# Patient Record
Sex: FEMALE | Race: WHITE | NOT HISPANIC OR LATINO | Employment: OTHER | URBAN - METROPOLITAN AREA
[De-identification: names, ages, dates, MRNs, and addresses within clinical notes are randomized per-mention and may not be internally consistent; named-entity substitution may affect disease eponyms.]

---

## 2018-05-30 ENCOUNTER — HOSPITAL ENCOUNTER (OUTPATIENT)
Dept: RADIOLOGY | Facility: HOSPITAL | Age: 73
Discharge: HOME | End: 2018-05-30
Attending: OBSTETRICS & GYNECOLOGY
Payer: MEDICARE

## 2018-05-30 VITALS — BODY MASS INDEX: 22.43 KG/M2 | HEIGHT: 68 IN | WEIGHT: 148 LBS

## 2018-05-30 DIAGNOSIS — Z12.39 SCREENING BREAST EXAMINATION: ICD-10-CM

## 2018-05-30 PROCEDURE — 77067 SCR MAMMO BI INCL CAD: CPT | Mod: 26,,, | Performed by: RADIOLOGY

## 2018-05-30 PROCEDURE — 77063 BREAST TOMOSYNTHESIS BI: CPT | Mod: 26,,, | Performed by: RADIOLOGY

## 2018-05-30 PROCEDURE — 77067 SCR MAMMO BI INCL CAD: CPT | Mod: TC,PN

## 2019-02-28 ENCOUNTER — TELEPHONE (OUTPATIENT)
Dept: VASCULAR SURGERY | Facility: CLINIC | Age: 74
End: 2019-02-28

## 2019-02-28 ENCOUNTER — OFFICE VISIT (OUTPATIENT)
Dept: DERMATOLOGY | Facility: CLINIC | Age: 74
End: 2019-02-28
Payer: MEDICARE

## 2019-02-28 VITALS — BODY MASS INDEX: 23.04 KG/M2 | HEIGHT: 68 IN | WEIGHT: 152 LBS

## 2019-02-28 DIAGNOSIS — I87.8 VENOUS STASIS: ICD-10-CM

## 2019-02-28 DIAGNOSIS — I78.1 SPIDER VEINS: Primary | ICD-10-CM

## 2019-02-28 DIAGNOSIS — I87.309 STASIS EDEMA, UNSPECIFIED LATERALITY: ICD-10-CM

## 2019-02-28 DIAGNOSIS — I83.899 SYMPTOMATIC RETICULAR VEINS: ICD-10-CM

## 2019-02-28 PROCEDURE — 99203 OFFICE O/P NEW LOW 30 MIN: CPT | Mod: S$PBB,,, | Performed by: DERMATOLOGY

## 2019-02-28 PROCEDURE — 99203 PR OFFICE/OUTPT VISIT, NEW, LEVL III, 30-44 MIN: ICD-10-PCS | Mod: S$PBB,,, | Performed by: DERMATOLOGY

## 2019-02-28 PROCEDURE — 99999 PR PBB SHADOW E&M-EST. PATIENT-LVL III: CPT | Mod: PBBFAC,,, | Performed by: DERMATOLOGY

## 2019-02-28 PROCEDURE — 99999 PR PBB SHADOW E&M-EST. PATIENT-LVL III: ICD-10-PCS | Mod: PBBFAC,,, | Performed by: DERMATOLOGY

## 2019-02-28 PROCEDURE — 99213 OFFICE O/P EST LOW 20 MIN: CPT | Mod: PBBFAC,PO | Performed by: DERMATOLOGY

## 2019-02-28 NOTE — LETTER
February 28, 2019      Fauzia Michaud MD  [address] [facility] [address]  [facility] [address]           [address]  [facility] [address]  Phone: [phone]          Patient: Naila Huerta   MR Number: [id number]   YOB: 1945   Date of Visit: 2/28/2019       Dear Dr. Fauzia Michaud:    Thank you for referring Naila Huerta to me for evaluation. Attached you will find relevant portions of my assessment and plan of care.    If you have questions, please do not hesitate to call me. I look forward to following Naila Huerta along with you.    Sincerely,    Fidel Aguirre MD    Enclosure  CC:  No Recipients    If you would like to receive this communication electronically, please contact [email or web address] or [phone] to request more information on [employer] access.    For providers and/or their staff who would like to refer a patient to [facility], please contact us through our one-stop-shop provider referral line, [facility], at [phone].    If you feel you have received this communication in error or would no longer like to receive these types of communications, please e-mail [email or web address]

## 2019-02-28 NOTE — TELEPHONE ENCOUNTER
----- Message from Sowmya Johnson LPN sent at 2/28/2019 10:35 AM CST -----  Patient assessed for spider veins treatment.  Wishes to have vascular assessment prior to us treating cosmetic spider veins.  Please contact patient for appt.

## 2019-02-28 NOTE — PROGRESS NOTES
Subjective:       Patient ID:  Naila Huerta is a 73 y.o. female who presents for   Chief Complaint   Patient presents with    Spider Veins     balwinder lower legs     New patient here today for eval of spider veins to BLE.  Previously treated veins to left inner leg by Dr. Chin at [facility] doctor about 3 years ago.         Review of Systems   Constitutional: Negative for fever.   HENT: Negative for sore throat and mouth sores.    Eyes: Negative for itching and eye watering.   Respiratory: Negative for cough.    Cardiovascular: Positive for pedal edema.   Musculoskeletal: Negative for joint swelling and arthralgias.   Skin: Negative for rash, daily sunscreen use, activity-related sunscreen use and wears hat.        Objective:    Physical Exam   Constitutional: She appears well-developed and well-nourished.   Eyes: No conjunctival no injection.   Cardiovascular: There is dependent edema.  There is no local extremity swelling.     Neurological: She is alert and oriented to person, place, and time.   Psychiatric: She has a normal mood and affect.   Skin:   Areas Examined (abnormalities noted in diagram):   Head / Face Inspection Performed  Neck Inspection Performed  RUE Inspected  LUE Inspection Performed  RLE Inspected  LLE Inspection Performed              Diagram Legend     Erythematous scaling macule/papule c/w actinic keratosis       Vascular papule c/w angioma      Pigmented verrucoid papule/plaque c/w seborrheic keratosis      Yellow umbilicated papule c/w sebaceous hyperplasia      Irregularly shaped tan macule c/w lentigo     1-2 mm smooth white papules consistent with Milia      Movable subcutaneous cyst with punctum c/w epidermal inclusion cyst      Subcutaneous movable cyst c/w pilar cyst      Firm pink to brown papule c/w dermatofibroma      Pedunculated fleshy papule(s) c/w skin tag(s)      Evenly pigmented macule c/w junctional nevus     Mildly variegated pigmented, slightly  irregular-bordered macule c/w mildly atypical nevus      Flesh colored to evenly pigmented papule c/w intradermal nevus       Pink pearly papule/plaque c/w basal cell carcinoma      Erythematous hyperkeratotic cursted plaque c/w SCC      Surgical scar with no sign of skin cancer recurrence      Open and closed comedones      Inflammatory papules and pustules      Verrucoid papule consistent consistent with wart     Erythematous eczematous patches and plaques     Dystrophic onycholytic nail with subungual debris c/w onychomycosis     Umbilicated papule    Erythematous-base heme-crusted tan verrucoid plaque consistent with inflamed seborrheic keratosis     Erythematous Silvery Scaling Plaque c/w Psoriasis     See annotation      Assessment / Plan:        Spider veins  Discussed with patient the etiology and pathogenesis of the disease or skin lesion(s) and possible treatments and aggravators.    Reviewed with patient different treatment options and associated risks.  Had sclero 3 years ago with vascular at [facility].  Discussed with patient the risks of sclerotherapy including, but not limited, to cosmetic costs, no change, scarring, ulcers, color scars, worsening, recurrence, need for compression stockings, possible need for ultrasound study of deeper veins and arteries with vascular surgery, and slow resolution, if any, of veins over three months.    Symptomatic reticular veins  Refer to vascular for eval of deep vein system.  May need ultrasound study.    Venous stasis  Discussed with patient the etiology and pathogenesis of the disease or skin lesion(s) and possible treatments and aggravators.    Discussed with patient need to wear compression stockings and to elevate legs regularly, especially with sitting.  Patient to consider elevating legs during sleep if no pain in the toes or numbness.  Patient can consider using ace wrap in lieu of stockings but has to watch out for bluing of the toes or pain or  numbness in the toes.  Patient may need evaluation with their primary or cardiologist for leg swelling.    Stasis edema, unspecified laterality  Refer to vascular.  Use pt's inversion table daily.  Pt doesn't want to wear compression stockings.           Follow-up if symptoms worsen or fail to improve.

## 2019-06-04 ENCOUNTER — TELEPHONE (OUTPATIENT)
Dept: DERMATOLOGY | Facility: CLINIC | Age: 74
End: 2019-06-04

## 2019-06-04 NOTE — TELEPHONE ENCOUNTER
Patient notified that Dr. Luna is no longer taking new patients for veins.  Will have to seek cosmetic vein consult outside of [facility].

## 2019-06-04 NOTE — TELEPHONE ENCOUNTER
----- Message from Sydnee Valladares sent at 6/4/2019 12:17 PM CDT -----  Contact: Naila  Type: Needs Medical Advice    Who Called:  patient  Best Call Back Number: [phone]  Additional Information: requesting a call back regarding a CV surgeon that can see her for an evaluation before cosmetic treatment of Spider Veins--patient would like to have an US first prior to treatment as well to see what is causing the swelling in her ankles (L is worse)--states that she was told that someone would contact her regarding the referral but no referral is listed in Epic & no one has contacted her regarding the Vein Evaluation--please advise--thank you

## 2019-06-18 ENCOUNTER — TELEPHONE (OUTPATIENT)
Dept: DERMATOLOGY | Facility: CLINIC | Age: 74
End: 2019-06-18

## 2019-06-18 NOTE — TELEPHONE ENCOUNTER
----- Message from Jose A MCINTYRE Jorjeroque sent at 6/18/2019  2:34 PM CDT -----  Contact: same  Patient called in and stated she was supposed to get a referral to a vascular surgeon from office based on Dr. Aguirre's findings when she went for her appt in February 2019.  Patient stated she already spoke to Dr. Frank's office & he know longer does this & Dr. Flakito rahman is booked out too far?    Patient call back number is [phone]

## 2019-07-01 NOTE — TELEPHONE ENCOUNTER
Several messages left for patient to let her know the place of referal is no longer taking new patients.  Unable to give furtehr recs.

## 2019-08-27 ENCOUNTER — HOSPITAL ENCOUNTER (OUTPATIENT)
Dept: PREADMISSION TESTING | Facility: OTHER | Age: 74
Discharge: HOME | End: 2019-08-27
Attending: ORTHOPAEDIC SURGERY
Payer: MEDICARE

## 2019-08-27 ENCOUNTER — ANESTHESIA EVENT (OUTPATIENT)
Dept: SURGERY | Facility: OTHER | Age: 74
End: 2019-08-27
Payer: MEDICARE

## 2019-08-27 VITALS
WEIGHT: 145 LBS | BODY MASS INDEX: 22.76 KG/M2 | TEMPERATURE: 98 F | DIASTOLIC BLOOD PRESSURE: 69 MMHG | HEART RATE: 64 BPM | OXYGEN SATURATION: 99 % | HEIGHT: 67 IN | SYSTOLIC BLOOD PRESSURE: 131 MMHG

## 2019-08-27 RX ORDER — LIDOCAINE HYDROCHLORIDE 10 MG/ML
0.5 INJECTION, SOLUTION EPIDURAL; INFILTRATION; INTRACAUDAL; PERINEURAL ONCE
Status: CANCELLED | OUTPATIENT
Start: 2019-08-27 | End: 2019-08-27

## 2019-08-27 RX ORDER — CELECOXIB 200 MG/1
400 CAPSULE ORAL
Status: CANCELLED | OUTPATIENT
Start: 2019-08-27 | End: 2019-08-27

## 2019-08-27 RX ORDER — PREGABALIN 75 MG/1
75 CAPSULE ORAL
Status: CANCELLED | OUTPATIENT
Start: 2019-08-27 | End: 2019-08-27

## 2019-08-27 RX ORDER — OMEPRAZOLE 20 MG/1
20 CAPSULE, DELAYED RELEASE ORAL DAILY
COMMUNITY
End: 2022-07-14 | Stop reason: SDUPTHER

## 2019-08-27 RX ORDER — VITAMIN B COMPLEX
1 TABLET ORAL DAILY
COMMUNITY

## 2019-08-27 RX ORDER — ACETAMINOPHEN 500 MG
1000 TABLET ORAL
Status: CANCELLED | OUTPATIENT
Start: 2019-08-27 | End: 2019-08-27

## 2019-08-27 RX ORDER — SODIUM CHLORIDE, SODIUM LACTATE, POTASSIUM CHLORIDE, CALCIUM CHLORIDE 600; 310; 30; 20 MG/100ML; MG/100ML; MG/100ML; MG/100ML
INJECTION, SOLUTION INTRAVENOUS CONTINUOUS
Status: CANCELLED | OUTPATIENT
Start: 2019-08-27

## 2019-08-27 NOTE — DISCHARGE INSTRUCTIONS
PRE-ADMIT TESTING -  [phone]    [address]  [place] [facility]          Your surgery has been scheduled at [facility]. We are pleased to have the opportunity to serve you. For Further Information please call [phone].    On the day of surgery please report to the Information Desk on the 1st floor.    · CONTACT YOUR PHYSICIAN'S OFFICE THE DAY PRIOR TO YOUR SURGERY TO OBTAIN YOUR ARRIVAL TIME.     · The evening before surgery do not eat anything after 9 p.m. ( this includes hard candy, chewing gum and mints).  You may only have GATORADE, POWERADE AND WATER  from 9 p.m. until you leave your home.   DO NOT DRINK ANY LIQUIDS ON THE WAY TO THE HOSPITAL.      SPECIAL MEDICATION INSTRUCTIONS: TAKE medications checked off by the Anesthesiologist on your Medication List.    Angiogram Patients: Take medications as instructed by your physician, including aspirin.     Surgery Patients:    If you take ASPIRIN - Your PHYSICIAN/SURGEON will need to inform you IF/OR when you need to stop taking aspirin prior to your surgery.     Do Not take any medications containing IBUPROFEN.  Do Not Wear any make-up or dark nail polish   (especially eye make-up) to surgery. If you come to surgery with makeup on you will be required to remove the makeup or nail polish.    Do not shave your surgical area at least 5 days prior to your surgery. The surgical prep will be performed at the hospital according to Infection Control regulations.    Leave all valuables at home.   Do Not wear any jewelry or watches, including any metal in body piercings. Jewelry must be removed prior to coming to the hospital.  There is a possibility that rings that are unable to be removed may be cut off if they are on the surgical extremity.    Contact Lens must be removed before surgery. Either do not wear the contact lens or bring a case and solution for storage.  Please bring a container for eyeglasses or dentures as required.  Bring  any paperwork your physician has provided, such as consent forms,  history and physicals, doctor's orders, etc.   Bring comfortable clothes that are loose fitting to wear upon discharge. Take into consideration the type of surgery being performed.  Maintain your diet as advised per your physician the day prior to surgery.      Adequate rest the night before surgery is advised.   Park in the Parking lot behind the hospital or in the [facility] across the street from the parking lot. Parking is complimentary.  If you will be discharged the same day as your procedure, please arrange for a responsible adult to drive you home or to accompany you if traveling by taxi.   YOU WILL NOT BE PERMITTED TO DRIVE OR TO LEAVE THE HOSPITAL ALONE AFTER SURGERY.   It is strongly recommended that you arrange for someone to remain with you for the first 24 hrs following your surgery.    The Surgeon will speak to your family/visitor after your surgery regarding the outcome of your surgery and post op care.  The Surgeon may speak to you after your surgery, but there is a possibility you may not remember the details.  Please check with your family members regarding the conversation with the Surgeon.    We strongly recommend whoever is bringing you home be present for discharge instructions.  This will ensure a thorough understanding for your post op home care.      Thank you for your cooperation.  The Staff of [facility].                Bathing Instructions with Hibiclens     Shower the evening before and morning of your procedure with Hibiclens:   Wash your face with water and your regular face wash/soap   Apply Hibiclens directly on your skin or on a wet washcloth and wash gently. When showering: Move away from the shower stream when applying Hibiclens to avoid rinsing off too soon.   Rinse thoroughly with warm water   Do not dilute Hibiclens         Dry off as usual, do not use any deodorant,  powder, body lotions, perfume, after shave or cologne.

## 2019-08-27 NOTE — ANESTHESIA PREPROCEDURE EVALUATION
08/27/2019  Naila Huerta is a 74 y.o., female.    Anesthesia Evaluation    I have reviewed the Patient Summary Reports.    I have reviewed the Nursing Notes.   I have reviewed the Medications.     Review of Systems  Anesthesia Hx:  PONV History of prior surgery of interest to airway management or planning: Denies Family Hx of Anesthesia complications.   Denies Personal Hx of Anesthesia complications.   Social:  Non-Smoker    Hematology/Oncology:  Hematology Normal   Oncology Normal     EENT/Dental:EENT/Dental Normal   Cardiovascular:   Exercise tolerance: good Hypertension, well controlled    Pulmonary:  Pulmonary Normal    Renal/:  Renal/ Normal     Hepatic/GI:   GERD, well controlled EGD for eso dilitation   Musculoskeletal:  Musculoskeletal Normal    Neurological:  Neurology Normal    Endocrine:  Endocrine Normal    Dermatological:  Skin Normal    Psych:  Psychiatric Normal           Physical Exam  General:  Well nourished    Airway/Jaw/Neck:  Airway Findings: Mouth Opening: Normal Tongue: Normal  Mallampati: II      Dental:  Dental Findings: Molar caps        Mental Status:  Mental Status Findings:  Cooperative, Alert and Oriented         Anesthesia Plan  Type of Anesthesia, risks & benefits discussed:  Anesthesia Type:  general  Patient's Preference:   Intra-op Monitoring Plan: standard ASA monitors  Intra-op Monitoring Plan Comments:   Post Op Pain Control Plan: multimodal analgesia  Post Op Pain Control Plan Comments:   Induction:   IV  Beta Blocker:         Informed Consent: Patient understands risks and agrees with Anesthesia plan.  Questions answered. Anesthesia consent signed with patient.  ASA Score: 2     Day of Surgery Review of History & Physical:    H&P update referred to the surgeon.     Anesthesia Plan Notes: Lab ok in epic        Ready For Surgery From Anesthesia  Perspective.

## 2019-09-06 ENCOUNTER — ANESTHESIA (OUTPATIENT)
Dept: SURGERY | Facility: OTHER | Age: 74
End: 2019-09-06
Payer: MEDICARE

## 2019-09-06 ENCOUNTER — HOSPITAL ENCOUNTER (OUTPATIENT)
Facility: OTHER | Age: 74
Discharge: HOME | End: 2019-09-06
Attending: ORTHOPAEDIC SURGERY | Admitting: ORTHOPAEDIC SURGERY
Payer: MEDICARE

## 2019-09-06 VITALS
OXYGEN SATURATION: 99 % | DIASTOLIC BLOOD PRESSURE: 82 MMHG | RESPIRATION RATE: 16 BRPM | TEMPERATURE: 99 F | SYSTOLIC BLOOD PRESSURE: 133 MMHG | HEIGHT: 67 IN | BODY MASS INDEX: 22.76 KG/M2 | HEART RATE: 62 BPM | WEIGHT: 145 LBS

## 2019-09-06 DIAGNOSIS — S83.242A ACUTE MEDIAL MENISCUS TEAR OF LEFT KNEE, INITIAL ENCOUNTER: Primary | ICD-10-CM

## 2019-09-06 PROCEDURE — 25000003 PHARM REV CODE 250: Performed by: ANESTHESIOLOGY

## 2019-09-06 PROCEDURE — 71000033 HC RECOVERY, INTIAL HOUR: Performed by: ORTHOPAEDIC SURGERY

## 2019-09-06 PROCEDURE — 36000711: Performed by: ORTHOPAEDIC SURGERY

## 2019-09-06 PROCEDURE — 63600175 PHARM REV CODE 636 W HCPCS: Performed by: ANESTHESIOLOGY

## 2019-09-06 PROCEDURE — 37000008 HC ANESTHESIA 1ST 15 MINUTES: Performed by: ORTHOPAEDIC SURGERY

## 2019-09-06 PROCEDURE — 71000016 HC POSTOP RECOV ADDL HR: Performed by: ORTHOPAEDIC SURGERY

## 2019-09-06 PROCEDURE — 25000003 PHARM REV CODE 250: Performed by: NURSE ANESTHETIST, CERTIFIED REGISTERED

## 2019-09-06 PROCEDURE — 37000009 HC ANESTHESIA EA ADD 15 MINS: Performed by: ORTHOPAEDIC SURGERY

## 2019-09-06 PROCEDURE — 27201423 OPTIME MED/SURG SUP & DEVICES STERILE SUPPLY: Performed by: ORTHOPAEDIC SURGERY

## 2019-09-06 PROCEDURE — 63600175 PHARM REV CODE 636 W HCPCS: Performed by: ORTHOPAEDIC SURGERY

## 2019-09-06 PROCEDURE — 71000039 HC RECOVERY, EACH ADD'L HOUR: Performed by: ORTHOPAEDIC SURGERY

## 2019-09-06 PROCEDURE — 25000003 PHARM REV CODE 250: Performed by: ORTHOPAEDIC SURGERY

## 2019-09-06 PROCEDURE — 71000015 HC POSTOP RECOV 1ST HR: Performed by: ORTHOPAEDIC SURGERY

## 2019-09-06 PROCEDURE — 36000710: Performed by: ORTHOPAEDIC SURGERY

## 2019-09-06 PROCEDURE — 63600175 PHARM REV CODE 636 W HCPCS: Performed by: NURSE ANESTHETIST, CERTIFIED REGISTERED

## 2019-09-06 RX ORDER — LIDOCAINE HYDROCHLORIDE 10 MG/ML
0.5 INJECTION, SOLUTION EPIDURAL; INFILTRATION; INTRACAUDAL; PERINEURAL ONCE
Status: DISCONTINUED | OUTPATIENT
Start: 2019-09-06 | End: 2019-09-06 | Stop reason: HOSPADM

## 2019-09-06 RX ORDER — PREGABALIN 75 MG/1
75 CAPSULE ORAL
Status: DISCONTINUED | OUTPATIENT
Start: 2019-09-06 | End: 2019-09-06 | Stop reason: HOSPADM

## 2019-09-06 RX ORDER — MIDAZOLAM HYDROCHLORIDE 1 MG/ML
INJECTION INTRAMUSCULAR; INTRAVENOUS
Status: DISCONTINUED | OUTPATIENT
Start: 2019-09-06 | End: 2019-09-06

## 2019-09-06 RX ORDER — HYDROMORPHONE HYDROCHLORIDE 2 MG/ML
0.4 INJECTION, SOLUTION INTRAMUSCULAR; INTRAVENOUS; SUBCUTANEOUS EVERY 5 MIN PRN
Status: DISCONTINUED | OUTPATIENT
Start: 2019-09-06 | End: 2019-09-06 | Stop reason: HOSPADM

## 2019-09-06 RX ORDER — DEXTROSE MONOHYDRATE AND SODIUM CHLORIDE 5; .45 G/100ML; G/100ML
INJECTION, SOLUTION INTRAVENOUS CONTINUOUS
Status: DISCONTINUED | OUTPATIENT
Start: 2019-09-06 | End: 2019-09-06 | Stop reason: HOSPADM

## 2019-09-06 RX ORDER — MORPHINE SULFATE 10 MG/ML
INJECTION, SOLUTION INTRAMUSCULAR; INTRAVENOUS
Status: DISCONTINUED | OUTPATIENT
Start: 2019-09-06 | End: 2019-09-06 | Stop reason: HOSPADM

## 2019-09-06 RX ORDER — BUPIVACAINE HYDROCHLORIDE 2.5 MG/ML
INJECTION, SOLUTION EPIDURAL; INFILTRATION; INTRACAUDAL
Status: DISCONTINUED | OUTPATIENT
Start: 2019-09-06 | End: 2019-09-06 | Stop reason: HOSPADM

## 2019-09-06 RX ORDER — SODIUM CHLORIDE 0.9 % (FLUSH) 0.9 %
3 SYRINGE (ML) INJECTION
Status: DISCONTINUED | OUTPATIENT
Start: 2019-09-06 | End: 2019-09-06 | Stop reason: HOSPADM

## 2019-09-06 RX ORDER — ACETAMINOPHEN 500 MG
1000 TABLET ORAL
Status: COMPLETED | OUTPATIENT
Start: 2019-09-06 | End: 2019-09-06

## 2019-09-06 RX ORDER — TRAMADOL HYDROCHLORIDE 50 MG/1
TABLET ORAL
Qty: 40 TABLET | Refills: 0 | Status: SHIPPED | OUTPATIENT
Start: 2019-09-06 | End: 2019-10-09

## 2019-09-06 RX ORDER — FENTANYL CITRATE 50 UG/ML
INJECTION, SOLUTION INTRAMUSCULAR; INTRAVENOUS
Status: DISCONTINUED | OUTPATIENT
Start: 2019-09-06 | End: 2019-09-06

## 2019-09-06 RX ORDER — CELECOXIB 200 MG/1
400 CAPSULE ORAL
Status: COMPLETED | OUTPATIENT
Start: 2019-09-06 | End: 2019-09-06

## 2019-09-06 RX ORDER — OXYCODONE HYDROCHLORIDE 5 MG/1
5 TABLET ORAL
Status: DISCONTINUED | OUTPATIENT
Start: 2019-09-06 | End: 2019-09-06 | Stop reason: HOSPADM

## 2019-09-06 RX ORDER — ONDANSETRON 2 MG/ML
4 INJECTION INTRAMUSCULAR; INTRAVENOUS DAILY PRN
Status: DISCONTINUED | OUTPATIENT
Start: 2019-09-06 | End: 2019-09-06 | Stop reason: HOSPADM

## 2019-09-06 RX ORDER — ONDANSETRON 8 MG/1
8 TABLET, ORALLY DISINTEGRATING ORAL ONCE
Status: COMPLETED | OUTPATIENT
Start: 2019-09-06 | End: 2019-09-06

## 2019-09-06 RX ORDER — ONDANSETRON 4 MG/1
8 TABLET, ORALLY DISINTEGRATING ORAL EVERY 8 HOURS PRN
Qty: 20 TABLET | Refills: 0 | Status: SHIPPED | OUTPATIENT
Start: 2019-09-06 | End: 2019-10-09

## 2019-09-06 RX ORDER — ONDANSETRON 2 MG/ML
INJECTION INTRAMUSCULAR; INTRAVENOUS
Status: DISCONTINUED | OUTPATIENT
Start: 2019-09-06 | End: 2019-09-06

## 2019-09-06 RX ORDER — PROPOFOL 10 MG/ML
VIAL (ML) INTRAVENOUS
Status: DISCONTINUED | OUTPATIENT
Start: 2019-09-06 | End: 2019-09-06

## 2019-09-06 RX ORDER — LIDOCAINE HCL/PF 100 MG/5ML
SYRINGE (ML) INTRAVENOUS
Status: DISCONTINUED | OUTPATIENT
Start: 2019-09-06 | End: 2019-09-06

## 2019-09-06 RX ORDER — CEFAZOLIN SODIUM 1 G/3ML
2 INJECTION, POWDER, FOR SOLUTION INTRAMUSCULAR; INTRAVENOUS
Status: DISCONTINUED | OUTPATIENT
Start: 2019-09-06 | End: 2019-09-06 | Stop reason: HOSPADM

## 2019-09-06 RX ORDER — MEPERIDINE HYDROCHLORIDE 25 MG/ML
12.5 INJECTION INTRAMUSCULAR; INTRAVENOUS; SUBCUTANEOUS ONCE AS NEEDED
Status: DISCONTINUED | OUTPATIENT
Start: 2019-09-06 | End: 2019-09-06 | Stop reason: HOSPADM

## 2019-09-06 RX ORDER — SODIUM CHLORIDE, SODIUM LACTATE, POTASSIUM CHLORIDE, CALCIUM CHLORIDE 600; 310; 30; 20 MG/100ML; MG/100ML; MG/100ML; MG/100ML
INJECTION, SOLUTION INTRAVENOUS CONTINUOUS
Status: DISCONTINUED | OUTPATIENT
Start: 2019-09-06 | End: 2019-09-06 | Stop reason: HOSPADM

## 2019-09-06 RX ORDER — KETOROLAC TROMETHAMINE 30 MG/ML
15 INJECTION, SOLUTION INTRAMUSCULAR; INTRAVENOUS EVERY 6 HOURS PRN
Status: DISCONTINUED | OUTPATIENT
Start: 2019-09-06 | End: 2019-09-06 | Stop reason: HOSPADM

## 2019-09-06 RX ORDER — METHYLPREDNISOLONE ACETATE 40 MG/ML
INJECTION, SUSPENSION INTRA-ARTICULAR; INTRALESIONAL; INTRAMUSCULAR; SOFT TISSUE
Status: DISCONTINUED | OUTPATIENT
Start: 2019-09-06 | End: 2019-09-06 | Stop reason: HOSPADM

## 2019-09-06 RX ADMIN — PROMETHAZINE HYDROCHLORIDE 6.25 MG: 25 INJECTION INTRAMUSCULAR; INTRAVENOUS at 10:09

## 2019-09-06 RX ADMIN — LIDOCAINE HYDROCHLORIDE 50 MG: 20 INJECTION, SOLUTION INTRAVENOUS at 07:09

## 2019-09-06 RX ADMIN — OXYCODONE HYDROCHLORIDE 5 MG: 5 TABLET ORAL at 08:09

## 2019-09-06 RX ADMIN — CEFAZOLIN 2 G: 330 INJECTION, POWDER, FOR SOLUTION INTRAMUSCULAR; INTRAVENOUS at 07:09

## 2019-09-06 RX ADMIN — HYDROMORPHONE HYDROCHLORIDE 0.4 MG: 2 INJECTION, SOLUTION INTRAMUSCULAR; INTRAVENOUS; SUBCUTANEOUS at 08:09

## 2019-09-06 RX ADMIN — FENTANYL CITRATE 100 MCG: 50 INJECTION, SOLUTION INTRAMUSCULAR; INTRAVENOUS at 07:09

## 2019-09-06 RX ADMIN — SODIUM CHLORIDE, SODIUM LACTATE, POTASSIUM CHLORIDE, AND CALCIUM CHLORIDE: 600; 310; 30; 20 INJECTION, SOLUTION INTRAVENOUS at 06:09

## 2019-09-06 RX ADMIN — ONDANSETRON 8 MG: 8 TABLET, ORALLY DISINTEGRATING ORAL at 10:09

## 2019-09-06 RX ADMIN — ACETAMINOPHEN 1000 MG: 500 TABLET, FILM COATED ORAL at 05:09

## 2019-09-06 RX ADMIN — ONDANSETRON 4 MG: 2 INJECTION INTRAMUSCULAR; INTRAVENOUS at 07:09

## 2019-09-06 RX ADMIN — CARBOXYMETHYLCELLULOSE SODIUM 2 DROP: 2.5 SOLUTION/ DROPS OPHTHALMIC at 07:09

## 2019-09-06 RX ADMIN — MIDAZOLAM HYDROCHLORIDE 2 MG: 1 INJECTION, SOLUTION INTRAMUSCULAR; INTRAVENOUS at 07:09

## 2019-09-06 RX ADMIN — PROPOFOL 150 MG: 10 INJECTION, EMULSION INTRAVENOUS at 07:09

## 2019-09-06 RX ADMIN — CELECOXIB 400 MG: 200 CAPSULE ORAL at 05:09

## 2019-09-06 RX ADMIN — PROPOFOL 50 MG: 10 INJECTION, EMULSION INTRAVENOUS at 07:09

## 2019-09-06 NOTE — DISCHARGE INSTRUCTIONS

## 2019-09-06 NOTE — OP NOTE
[facility]  Brief Operative Note     SUMMARY     Surgery Date: 9/6/2019     Surgeon(s) and Role:     * Van Monreal MD - Primary    Assisting Surgeon: ENRIQUE Santo FA    Pre-op Diagnosis: 1. Acute medial meniscus tear of left knee, initial encounter [S83.242A]                                   2. Acute lateral meniscus tear of left knee, initial encounter    Post-op Diagnosis:  Post-Op Diagnosis Codes:     1.Acute medial meniscus tear of left knee, initial encounter [S83.242A]     2.  Acute lateral meniscus tear of left knee, initial encounter    Procedure:  Arthroscopy left knee with partial medial and lateral meniscectomy  Anesthesia: General    Description of the findings of the procedure:  After the appropriate consents were signed the patient understood and accepted all risks and complications she was brought to the operating room and underwent general anesthesia.  Tourniquet was applied to the proximal left leg and left lower extremity was then prepped and draped in a sterile manner. The left leg was exsanguinated and tourniquet inflated to 300 mm of mercury.  The standard superior medial inflow was established and the knee was inflated with saline. The scope was introduced through the anterior lateral portal and inspection of the patellofemoral joint demonstrated some grade 2 chondromalacia.  There were no loose bodies.    Inspection of the medial compartment demonstrated a tear of the posterior horn of the medial meniscus.  An anterior medial portal was established with a spinal needle and a probe was introduced.  Utilizing up-biting basket forceps and shaver the tear was taken back to stable rim.    Inspection of the intercondylar notch demonstrated intact anterior cruciate ligament.  Inspection of the lateral compartment demonstrated some degenerative tearing of the lateral meniscus.  This was removed with straight and up-biting basket forceps and shaver.  Both the medial and  lateral compartments had some grade 1/2 chondromalacia.    The knee was irrigated out with saline and 10 cc 0.25% Marcaine with cc of Depo-Medrol were injected into the knee.  The portals were closed with 4 nylon suture and a sterile compressive dressing was applied.  Tourniquet was deflated patient was extubated and brought to recovery room in good condition.      Estimated Blood Loss:  Minimal         Specimens:   Specimen (12h ago, onward)    None          Discharge Note    SUMMARY     Admit Date: 9/6/2019    Discharge Date and Time: No discharge date for patient encounter.    Hospital Course (synopsis of major diagnoses, care, treatment, and services provided during the course of the hospital stay):  74-year-old female status post arthroscopy left with partial medial and lateral meniscectomy.  She did well postoperatively and will be seen back in 2 weeks for follow-up.      Final Diagnosis: Post-Op Diagnosis Codes:     * Acute medial meniscus tear of left knee, initial encounter [S83.242A]    Disposition: Home or Self Care    Follow Up/Patient Instructions:     Medications:  Reconciled Home Medications:      Medication List      START taking these medications    ondansetron 4 MG Tbdl  Commonly known as:  ZOFRAN-ODT  Take 2 tablets (8 mg total) by mouth every 8 (eight) hours as needed.     traMADol 50 mg tablet  Commonly known as:  ULTRAM  1-2 tablets every 8 hours prn pain        CONTINUE taking these medications    b complex vitamins tablet  Take 1 tablet by mouth once daily.     BIOTIN ORAL  Take by mouth.     buPROPion 150 MG TB24 tablet  Commonly known as:  WELLBUTRIN XL  Take 1 tablet (150 mg total) by mouth once daily.     CRANBERRY CONCENTRATE 500 mg Cap  Generic drug:  cranberry extract  Take by mouth.     ESTRACE 2 MG tablet  Generic drug:  estradiol  Take 2 mg by mouth Daily. Every day     hydroCHLOROthiazide 25 MG tablet  Commonly known as:  HYDRODIURIL  Take 0.5 tablets (12.5 mg total) by mouth once  daily.     lutein-zeaxanthin 25-5 mg Cap  Take by mouth once daily.     magnesium 30 mg Tab  Take by mouth.     multivitamin per tablet  Commonly known as:  THERAGRAN  Take 1 tablet by mouth once daily.     omeprazole 20 MG capsule  Commonly known as:  PRILOSEC  Take 20 mg by mouth once daily.     * telmisartan 40 MG Tab  Commonly known as:  MICARDIS  Take 1 tablet (40 mg total) by mouth once daily.     * telmisartan 40 MG Tab  Commonly known as:  MICARDIS  TAKE 1 TABLET (40 MG TOTAL) BY MOUTH ONCE DAILY.     VITAMIN B-12 2,500 mcg Subl  Generic drug:  cyanocobalamin (vitamin B-12)  Place 1 tablet under the tongue once daily.     vitamin D 1000 units Tab  Commonly known as:  VITAMIN D3  Take 1,000 Units by mouth once daily.         * This list has 2 medication(s) that are the same as other medications prescribed for you. Read the directions carefully, and ask your doctor or other care provider to review them with you.              No discharge procedures on file.

## 2019-09-06 NOTE — PLAN OF CARE
Naila Rinku Huerta has met all discharge criteria from Phase II. Vital Signs are stable, ambulating  without difficulty. Discharge instructions given, patient verbalized understanding. Discharged from facility via wheelchair in stable condition.

## 2019-09-06 NOTE — ANESTHESIA POSTPROCEDURE EVALUATION
Anesthesia Post Evaluation    Patient: Naila Huerta    Procedure(s) Performed: Procedure(s) (LRB):  ARTHROSCOPY, KNEE (Left)  MENISCECTOMY, KNEE, MEDIAL (Left)    Final Anesthesia Type: general  Patient location during evaluation: PACU  Patient participation: Yes- Able to Participate  Level of consciousness: oriented and awake  Post-procedure vital signs: reviewed and stable  Pain management: adequate  Airway patency: patent  PONV status at discharge: No PONV  Anesthetic complications: no      Cardiovascular status: hemodynamically stable  Respiratory status: unassisted, spontaneous ventilation and room air  Hydration status: euvolemic  Follow-up not needed.          Vitals Value Taken Time   /82 9/6/2019 11:25 AM   Temp 37 °C (98.6 °F) 9/6/2019  9:25 AM   Pulse 62 9/6/2019 11:25 AM   Resp 16 9/6/2019 11:25 AM   SpO2 99 % 9/6/2019 11:25 AM         Event Time     Out of Recovery 09:11:44          Pain/Zac Score: Pain Rating Prior to Med Admin: 7 (9/6/2019  8:32 AM)  Pain Rating Post Med Admin: 3 (9/6/2019  8:45 AM)  Zac Score: 10 (9/6/2019 11:25 AM)

## 2019-12-30 ENCOUNTER — ANESTHESIA EVENT (OUTPATIENT)
Dept: SURGERY | Facility: OTHER | Age: 74
End: 2019-12-30
Payer: MEDICARE

## 2019-12-30 ENCOUNTER — HOSPITAL ENCOUNTER (OUTPATIENT)
Dept: PREADMISSION TESTING | Facility: OTHER | Age: 74
Discharge: HOME | End: 2019-12-30
Attending: ORTHOPAEDIC SURGERY
Payer: MEDICARE

## 2019-12-30 VITALS
BODY MASS INDEX: 22.76 KG/M2 | HEART RATE: 73 BPM | DIASTOLIC BLOOD PRESSURE: 70 MMHG | RESPIRATION RATE: 18 BRPM | WEIGHT: 145 LBS | HEIGHT: 67 IN | SYSTOLIC BLOOD PRESSURE: 151 MMHG | TEMPERATURE: 98 F | OXYGEN SATURATION: 98 %

## 2019-12-30 DIAGNOSIS — S83.242A ACUTE MEDIAL MENISCUS TEAR OF LEFT KNEE, INITIAL ENCOUNTER: Primary | ICD-10-CM

## 2019-12-30 DIAGNOSIS — Z01.818 PREOP TESTING: ICD-10-CM

## 2019-12-30 LAB
ANION GAP SERPL CALC-SCNC: 6 MMOL/L (ref 8–16)
BASOPHILS # BLD AUTO: 0.05 K/UL (ref 0–0.2)
BASOPHILS NFR BLD: 1.1 % (ref 0–1.9)
BUN SERPL-MCNC: 15 MG/DL (ref 8–23)
CALCIUM SERPL-MCNC: 9.2 MG/DL (ref 8.7–10.5)
CHLORIDE SERPL-SCNC: 105 MMOL/L (ref 95–110)
CO2 SERPL-SCNC: 29 MMOL/L (ref 23–29)
CREAT SERPL-MCNC: 0.8 MG/DL (ref 0.5–1.4)
DIFFERENTIAL METHOD: ABNORMAL
EOSINOPHIL # BLD AUTO: 0.4 K/UL (ref 0–0.5)
EOSINOPHIL NFR BLD: 8.7 % (ref 0–8)
ERYTHROCYTE [DISTWIDTH] IN BLOOD BY AUTOMATED COUNT: 13.2 % (ref 11.5–14.5)
EST. GFR  (AFRICAN AMERICAN): >60 ML/MIN/1.73 M^2
EST. GFR  (NON AFRICAN AMERICAN): >60 ML/MIN/1.73 M^2
GLUCOSE SERPL-MCNC: 83 MG/DL (ref 70–110)
HCT VFR BLD AUTO: 41.9 % (ref 37–48.5)
HGB BLD-MCNC: 13.5 G/DL (ref 12–16)
IMM GRANULOCYTES # BLD AUTO: 0 K/UL (ref 0–0.04)
IMM GRANULOCYTES NFR BLD AUTO: 0 % (ref 0–0.5)
LYMPHOCYTES # BLD AUTO: 1.2 K/UL (ref 1–4.8)
LYMPHOCYTES NFR BLD: 27.8 % (ref 18–48)
MCH RBC QN AUTO: 30.8 PG (ref 27–31)
MCHC RBC AUTO-ENTMCNC: 32.2 G/DL (ref 32–36)
MCV RBC AUTO: 96 FL (ref 82–98)
MONOCYTES # BLD AUTO: 0.6 K/UL (ref 0.3–1)
MONOCYTES NFR BLD: 13.1 % (ref 4–15)
NEUTROPHILS # BLD AUTO: 2.1 K/UL (ref 1.8–7.7)
NEUTROPHILS NFR BLD: 49.3 % (ref 38–73)
NRBC BLD-RTO: 0 /100 WBC
PLATELET # BLD AUTO: 237 K/UL (ref 150–350)
PMV BLD AUTO: 9.8 FL (ref 9.2–12.9)
POTASSIUM SERPL-SCNC: 4.1 MMOL/L (ref 3.5–5.1)
RBC # BLD AUTO: 4.38 M/UL (ref 4–5.4)
SODIUM SERPL-SCNC: 140 MMOL/L (ref 136–145)
WBC # BLD AUTO: 4.35 K/UL (ref 3.9–12.7)

## 2019-12-30 PROCEDURE — 80048 BASIC METABOLIC PNL TOTAL CA: CPT

## 2019-12-30 PROCEDURE — 85025 COMPLETE CBC W/AUTO DIFF WBC: CPT

## 2019-12-30 PROCEDURE — 36415 COLL VENOUS BLD VENIPUNCTURE: CPT

## 2019-12-30 PROCEDURE — 93010 ELECTROCARDIOGRAM REPORT: CPT | Mod: ,,, | Performed by: INTERNAL MEDICINE

## 2019-12-30 PROCEDURE — 93005 ELECTROCARDIOGRAM TRACING: CPT

## 2019-12-30 PROCEDURE — 93010 EKG 12-LEAD: ICD-10-PCS | Mod: ,,, | Performed by: INTERNAL MEDICINE

## 2019-12-30 RX ORDER — ACETAMINOPHEN 500 MG
1000 TABLET ORAL
Status: CANCELLED | OUTPATIENT
Start: 2019-12-30 | End: 2019-12-30

## 2019-12-30 RX ORDER — LIDOCAINE HYDROCHLORIDE 10 MG/ML
0.5 INJECTION, SOLUTION EPIDURAL; INFILTRATION; INTRACAUDAL; PERINEURAL ONCE
Status: CANCELLED | OUTPATIENT
Start: 2019-12-30 | End: 2019-12-30

## 2019-12-30 RX ORDER — SODIUM CHLORIDE, SODIUM LACTATE, POTASSIUM CHLORIDE, CALCIUM CHLORIDE 600; 310; 30; 20 MG/100ML; MG/100ML; MG/100ML; MG/100ML
INJECTION, SOLUTION INTRAVENOUS CONTINUOUS
Status: CANCELLED | OUTPATIENT
Start: 2019-12-30

## 2019-12-30 RX ORDER — FAMOTIDINE 20 MG/1
20 TABLET, FILM COATED ORAL
Status: CANCELLED | OUTPATIENT
Start: 2019-12-30 | End: 2019-12-30

## 2019-12-30 RX ORDER — PREGABALIN 75 MG/1
75 CAPSULE ORAL
Status: CANCELLED | OUTPATIENT
Start: 2019-12-30 | End: 2019-12-30

## 2019-12-30 NOTE — DISCHARGE INSTRUCTIONS
PRE-ADMIT TESTING -  [phone]    [address]  [place] [facility]          Your surgery has been scheduled at [facility]. We are pleased to have the opportunity to serve you. For Further Information please call [phone].    On the day of surgery please report to the Information Desk on the 1st floor.    · CONTACT YOUR PHYSICIAN'S OFFICE THE DAY PRIOR TO YOUR SURGERY TO OBTAIN YOUR ARRIVAL TIME.     · The evening before surgery do not eat anything after 9 p.m. ( this includes hard candy, chewing gum and mints).  You may only have GATORADE, POWERADE AND WATER  from 9 p.m. until you leave your home.   DO NOT DRINK ANY LIQUIDS ON THE WAY TO THE HOSPITAL.      SPECIAL MEDICATION INSTRUCTIONS: TAKE medications checked off by the Anesthesiologist on your Medication List.    Angiogram Patients: Take medications as instructed by your physician, including aspirin.     Surgery Patients:    If you take ASPIRIN - Your PHYSICIAN/SURGEON will need to inform you IF/OR when you need to stop taking aspirin prior to your surgery.     Do Not take any medications containing IBUPROFEN.  Do Not Wear any make-up or dark nail polish   (especially eye make-up) to surgery. If you come to surgery with makeup on you will be required to remove the makeup or nail polish.    Do not shave your surgical area at least 5 days prior to your surgery. The surgical prep will be performed at the hospital according to Infection Control regulations.    Leave all valuables at home.   Do Not wear any jewelry or watches, including any metal in body piercings. Jewelry must be removed prior to coming to the hospital.  There is a possibility that rings that are unable to be removed may be cut off if they are on the surgical extremity.    Contact Lens must be removed before surgery. Either do not wear the contact lens or bring a case and solution for storage.  Please bring a container for eyeglasses or dentures as required.  Bring  any paperwork your physician has provided, such as consent forms,  history and physicals, doctor's orders, etc.   Bring comfortable clothes that are loose fitting to wear upon discharge. Take into consideration the type of surgery being performed.  Maintain your diet as advised per your physician the day prior to surgery.      Adequate rest the night before surgery is advised.   Park in the Parking lot behind the hospital or in the [place] across the street from the parking lot. Parking is complimentary.  If you will be discharged the same day as your procedure, please arrange for a responsible adult to drive you home or to accompany you if traveling by taxi.   YOU WILL NOT BE PERMITTED TO DRIVE OR TO LEAVE THE HOSPITAL ALONE AFTER SURGERY.   It is strongly recommended that you arrange for someone to remain with you for the first 24 hrs following your surgery.    The Surgeon will speak to your family/visitor after your surgery regarding the outcome of your surgery and post op care.  The Surgeon may speak to you after your surgery, but there is a possibility you may not remember the details.  Please check with your family members regarding the conversation with the Surgeon.    We strongly recommend whoever is bringing you home be present for discharge instructions.  This will ensure a thorough understanding for your post op home care.    EACH PATIENT IS ALLOWED TWO FAMILY MEMBERS OR VISITORS IN THE ROOM AND IN THE WAITING ROOMS WHILE YOU ARE IN SURGERY. ALL CHILDREN MUST ALWAYS BE ACCOMPANIED BY AN ADULT.    Thank you for your cooperation.  The Staff of [facility].                Bathing Instructions with Hibiclens     Shower the evening before and morning of your procedure with Hibiclens:   Wash your face with water and your regular face wash/soap   Apply Hibiclens directly on your skin or on a wet washcloth and wash gently. When showering: Move away from the shower stream when  applying Hibiclens to avoid rinsing off too soon.   Rinse thoroughly with warm water   Do not dilute Hibiclens         Dry off as usual, do not use any deodorant, powder, body lotions, perfume, after shave or cologne.

## 2019-12-30 NOTE — ANESTHESIA PREPROCEDURE EVALUATION
12/30/2019  Naila Huerta is a 74 y.o., female.    Pre-op Assessment    I have reviewed the Patient Summary Reports.     I have reviewed the Nursing Notes.   I have reviewed the Medications.     Review of Systems  Anesthesia Hx:  Hx of Anesthetic complications PONV History of prior surgery of interest to airway management or planning: Denies Family Hx of Anesthesia complications.   Denies Personal Hx of Anesthesia complications.   Social:  Non-Smoker, Social Alcohol Use    Hematology/Oncology:  Hematology Normal   Oncology Normal     EENT/Dental:EENT/Dental Normal   Cardiovascular:   Exercise tolerance: good Hypertension, well controlled Valvular problems/Murmurs, MVP H/o palpitations.   Pulmonary:  Pulmonary Normal    Renal/:  Renal/ Normal     Hepatic/GI:   GERD, well controlled EGD for eso dilitation   Musculoskeletal:   Scoliosis. Spine Disorders:    Neurological:  Neurology Normal    Endocrine:  Endocrine Normal    Dermatological:  Skin Normal    Psych:  Psychiatric Normal           Physical Exam  General:  Well nourished    Airway/Jaw/Neck:  Airway Findings: Mouth Opening: Normal Tongue: Normal  General Airway Assessment: Adult, Good  Mallampati: II  TM Distance: Normal, at least 6 cm      Dental:  Dental Findings: Molar caps, In tact        Mental Status:  Mental Status Findings:  Cooperative, Alert and Oriented         Anesthesia Plan  Type of Anesthesia, risks & benefits discussed:  Anesthesia Type:  general  Patient's Preference:   Intra-op Monitoring Plan: standard ASA monitors  Intra-op Monitoring Plan Comments:   Post Op Pain Control Plan: multimodal analgesia and per primary service following discharge from PACU  Post Op Pain Control Plan Comments:   Induction:   IV  Beta Blocker:         Informed Consent: Patient understands risks and agrees with Anesthesia plan.  Questions  answered. Anesthesia consent signed with patient.  ASA Score: 2     Day of Surgery Review of History & Physical:    H&P update referred to the surgeon.     Anesthesia Plan Notes: Returns for repeat knee arthroscopy. Labs and EKG today.        Ready For Surgery From Anesthesia Perspective.

## 2020-01-08 ENCOUNTER — ANESTHESIA (OUTPATIENT)
Dept: SURGERY | Facility: OTHER | Age: 75
End: 2020-01-08
Payer: MEDICARE

## 2020-01-08 ENCOUNTER — HOSPITAL ENCOUNTER (OUTPATIENT)
Facility: OTHER | Age: 75
Discharge: HOME | End: 2020-01-08
Attending: ORTHOPAEDIC SURGERY | Admitting: ORTHOPAEDIC SURGERY
Payer: MEDICARE

## 2020-01-08 VITALS
TEMPERATURE: 98 F | WEIGHT: 145.06 LBS | BODY MASS INDEX: 22.72 KG/M2 | OXYGEN SATURATION: 96 % | SYSTOLIC BLOOD PRESSURE: 171 MMHG | RESPIRATION RATE: 16 BRPM | DIASTOLIC BLOOD PRESSURE: 81 MMHG | HEART RATE: 70 BPM

## 2020-01-08 DIAGNOSIS — Z01.818 PREOP TESTING: ICD-10-CM

## 2020-01-08 DIAGNOSIS — S83.242D ACUTE MEDIAL MENISCUS TEAR OF LEFT KNEE, SUBSEQUENT ENCOUNTER: Primary | ICD-10-CM

## 2020-01-08 DIAGNOSIS — S83.242A ACUTE MEDIAL MENISCUS TEAR OF LEFT KNEE, INITIAL ENCOUNTER: ICD-10-CM

## 2020-01-08 PROCEDURE — 27201423 OPTIME MED/SURG SUP & DEVICES STERILE SUPPLY: Performed by: ORTHOPAEDIC SURGERY

## 2020-01-08 PROCEDURE — 25000003 PHARM REV CODE 250: Performed by: SPECIALIST

## 2020-01-08 PROCEDURE — 36000711: Performed by: ORTHOPAEDIC SURGERY

## 2020-01-08 PROCEDURE — 63600175 PHARM REV CODE 636 W HCPCS: Performed by: ORTHOPAEDIC SURGERY

## 2020-01-08 PROCEDURE — 71000016 HC POSTOP RECOV ADDL HR: Performed by: ORTHOPAEDIC SURGERY

## 2020-01-08 PROCEDURE — 71000033 HC RECOVERY, INTIAL HOUR: Performed by: ORTHOPAEDIC SURGERY

## 2020-01-08 PROCEDURE — 63600175 PHARM REV CODE 636 W HCPCS: Performed by: SPECIALIST

## 2020-01-08 PROCEDURE — 71000039 HC RECOVERY, EACH ADD'L HOUR: Performed by: ORTHOPAEDIC SURGERY

## 2020-01-08 PROCEDURE — 71000015 HC POSTOP RECOV 1ST HR: Performed by: ORTHOPAEDIC SURGERY

## 2020-01-08 PROCEDURE — 37000008 HC ANESTHESIA 1ST 15 MINUTES: Performed by: ORTHOPAEDIC SURGERY

## 2020-01-08 PROCEDURE — 37000009 HC ANESTHESIA EA ADD 15 MINS: Performed by: ORTHOPAEDIC SURGERY

## 2020-01-08 PROCEDURE — 63600175 PHARM REV CODE 636 W HCPCS: Performed by: NURSE ANESTHETIST, CERTIFIED REGISTERED

## 2020-01-08 PROCEDURE — 36000710: Performed by: ORTHOPAEDIC SURGERY

## 2020-01-08 PROCEDURE — 25000003 PHARM REV CODE 250: Performed by: ANESTHESIOLOGY

## 2020-01-08 PROCEDURE — 25000003 PHARM REV CODE 250: Performed by: ORTHOPAEDIC SURGERY

## 2020-01-08 RX ORDER — BUPIVACAINE HYDROCHLORIDE 2.5 MG/ML
INJECTION, SOLUTION EPIDURAL; INFILTRATION; INTRACAUDAL
Status: DISCONTINUED | OUTPATIENT
Start: 2020-01-08 | End: 2020-01-08 | Stop reason: HOSPADM

## 2020-01-08 RX ORDER — SODIUM CHLORIDE, SODIUM LACTATE, POTASSIUM CHLORIDE, CALCIUM CHLORIDE 600; 310; 30; 20 MG/100ML; MG/100ML; MG/100ML; MG/100ML
INJECTION, SOLUTION INTRAVENOUS CONTINUOUS
Status: DISCONTINUED | OUTPATIENT
Start: 2020-01-08 | End: 2020-01-08 | Stop reason: HOSPADM

## 2020-01-08 RX ORDER — OXYCODONE HYDROCHLORIDE 5 MG/1
5 TABLET ORAL
Status: DISCONTINUED | OUTPATIENT
Start: 2020-01-08 | End: 2020-01-08 | Stop reason: HOSPADM

## 2020-01-08 RX ORDER — ONDANSETRON 4 MG/1
8 TABLET, ORALLY DISINTEGRATING ORAL EVERY 8 HOURS PRN
Qty: 20 TABLET | Refills: 0 | Status: SHIPPED | OUTPATIENT
Start: 2020-01-08 | End: 2020-10-16

## 2020-01-08 RX ORDER — LIDOCAINE HCL/PF 100 MG/5ML
SYRINGE (ML) INTRAVENOUS
Status: DISCONTINUED | OUTPATIENT
Start: 2020-01-08 | End: 2020-01-08

## 2020-01-08 RX ORDER — MORPHINE SULFATE 10 MG/ML
INJECTION, SOLUTION INTRAMUSCULAR; INTRAVENOUS
Status: DISCONTINUED | OUTPATIENT
Start: 2020-01-08 | End: 2020-01-08 | Stop reason: HOSPADM

## 2020-01-08 RX ORDER — SODIUM CHLORIDE 0.9 % (FLUSH) 0.9 %
3 SYRINGE (ML) INJECTION
Status: DISCONTINUED | OUTPATIENT
Start: 2020-01-08 | End: 2020-01-08 | Stop reason: HOSPADM

## 2020-01-08 RX ORDER — PROPOFOL 10 MG/ML
VIAL (ML) INTRAVENOUS
Status: DISCONTINUED | OUTPATIENT
Start: 2020-01-08 | End: 2020-01-08

## 2020-01-08 RX ORDER — DEXTROSE MONOHYDRATE AND SODIUM CHLORIDE 5; .45 G/100ML; G/100ML
INJECTION, SOLUTION INTRAVENOUS CONTINUOUS
Status: DISCONTINUED | OUTPATIENT
Start: 2020-01-08 | End: 2020-01-08 | Stop reason: HOSPADM

## 2020-01-08 RX ORDER — LIDOCAINE HYDROCHLORIDE 10 MG/ML
0.5 INJECTION, SOLUTION EPIDURAL; INFILTRATION; INTRACAUDAL; PERINEURAL ONCE
Status: DISCONTINUED | OUTPATIENT
Start: 2020-01-08 | End: 2020-01-08 | Stop reason: HOSPADM

## 2020-01-08 RX ORDER — HYDROMORPHONE HYDROCHLORIDE 2 MG/ML
0.4 INJECTION, SOLUTION INTRAMUSCULAR; INTRAVENOUS; SUBCUTANEOUS EVERY 5 MIN PRN
Status: DISCONTINUED | OUTPATIENT
Start: 2020-01-08 | End: 2020-01-08 | Stop reason: HOSPADM

## 2020-01-08 RX ORDER — OXYCODONE HYDROCHLORIDE 5 MG/1
10 TABLET ORAL EVERY 4 HOURS PRN
Status: DISCONTINUED | OUTPATIENT
Start: 2020-01-08 | End: 2020-01-08 | Stop reason: HOSPADM

## 2020-01-08 RX ORDER — ONDANSETRON 2 MG/ML
INJECTION INTRAMUSCULAR; INTRAVENOUS
Status: DISCONTINUED | OUTPATIENT
Start: 2020-01-08 | End: 2020-01-08

## 2020-01-08 RX ORDER — ACETAMINOPHEN 500 MG
1000 TABLET ORAL
Status: COMPLETED | OUTPATIENT
Start: 2020-01-08 | End: 2020-01-08

## 2020-01-08 RX ORDER — OXYCODONE HYDROCHLORIDE 5 MG/1
5 TABLET ORAL EVERY 4 HOURS PRN
Status: DISCONTINUED | OUTPATIENT
Start: 2020-01-08 | End: 2020-01-08 | Stop reason: HOSPADM

## 2020-01-08 RX ORDER — MEPERIDINE HYDROCHLORIDE 25 MG/ML
12.5 INJECTION INTRAMUSCULAR; INTRAVENOUS; SUBCUTANEOUS ONCE AS NEEDED
Status: DISCONTINUED | OUTPATIENT
Start: 2020-01-08 | End: 2020-01-08 | Stop reason: HOSPADM

## 2020-01-08 RX ORDER — FENTANYL CITRATE 50 UG/ML
INJECTION, SOLUTION INTRAMUSCULAR; INTRAVENOUS
Status: DISCONTINUED | OUTPATIENT
Start: 2020-01-08 | End: 2020-01-08

## 2020-01-08 RX ORDER — OXYCODONE AND ACETAMINOPHEN 5; 325 MG/1; MG/1
1 TABLET ORAL EVERY 6 HOURS PRN
Qty: 40 TABLET | Refills: 0 | Status: SHIPPED | OUTPATIENT
Start: 2020-01-08 | End: 2020-10-16

## 2020-01-08 RX ORDER — MIDAZOLAM HYDROCHLORIDE 1 MG/ML
INJECTION INTRAMUSCULAR; INTRAVENOUS
Status: DISCONTINUED | OUTPATIENT
Start: 2020-01-08 | End: 2020-01-08

## 2020-01-08 RX ORDER — DIPHENHYDRAMINE HYDROCHLORIDE 50 MG/ML
25 INJECTION INTRAMUSCULAR; INTRAVENOUS EVERY 6 HOURS PRN
Status: DISCONTINUED | OUTPATIENT
Start: 2020-01-08 | End: 2020-01-08 | Stop reason: HOSPADM

## 2020-01-08 RX ORDER — ONDANSETRON 8 MG/1
8 TABLET, ORALLY DISINTEGRATING ORAL ONCE
Status: COMPLETED | OUTPATIENT
Start: 2020-01-08 | End: 2020-01-08

## 2020-01-08 RX ORDER — DEXAMETHASONE SODIUM PHOSPHATE 4 MG/ML
INJECTION, SOLUTION INTRA-ARTICULAR; INTRALESIONAL; INTRAMUSCULAR; INTRAVENOUS; SOFT TISSUE
Status: DISCONTINUED | OUTPATIENT
Start: 2020-01-08 | End: 2020-01-08

## 2020-01-08 RX ORDER — CEFAZOLIN SODIUM 1 G/3ML
2 INJECTION, POWDER, FOR SOLUTION INTRAMUSCULAR; INTRAVENOUS
Status: COMPLETED | OUTPATIENT
Start: 2020-01-08 | End: 2020-01-08

## 2020-01-08 RX ORDER — FAMOTIDINE 20 MG/1
20 TABLET, FILM COATED ORAL
Status: COMPLETED | OUTPATIENT
Start: 2020-01-08 | End: 2020-01-08

## 2020-01-08 RX ORDER — PREGABALIN 75 MG/1
75 CAPSULE ORAL
Status: COMPLETED | OUTPATIENT
Start: 2020-01-08 | End: 2020-01-08

## 2020-01-08 RX ORDER — METHYLPREDNISOLONE ACETATE 40 MG/ML
INJECTION, SUSPENSION INTRA-ARTICULAR; INTRALESIONAL; INTRAMUSCULAR; SOFT TISSUE
Status: DISCONTINUED | OUTPATIENT
Start: 2020-01-08 | End: 2020-01-08 | Stop reason: HOSPADM

## 2020-01-08 RX ORDER — ONDANSETRON 2 MG/ML
4 INJECTION INTRAMUSCULAR; INTRAVENOUS DAILY PRN
Status: DISCONTINUED | OUTPATIENT
Start: 2020-01-08 | End: 2020-01-08 | Stop reason: HOSPADM

## 2020-01-08 RX ADMIN — HYDROMORPHONE HYDROCHLORIDE 0.4 MG: 2 INJECTION, SOLUTION INTRAMUSCULAR; INTRAVENOUS; SUBCUTANEOUS at 02:01

## 2020-01-08 RX ADMIN — PROPOFOL 130 MG: 10 INJECTION, EMULSION INTRAVENOUS at 12:01

## 2020-01-08 RX ADMIN — PREGABALIN 75 MG: 75 CAPSULE ORAL at 10:01

## 2020-01-08 RX ADMIN — CEFAZOLIN 2 G: 330 INJECTION, POWDER, FOR SOLUTION INTRAMUSCULAR; INTRAVENOUS at 01:01

## 2020-01-08 RX ADMIN — FAMOTIDINE 20 MG: 20 TABLET ORAL at 10:01

## 2020-01-08 RX ADMIN — ONDANSETRON 4 MG: 2 INJECTION INTRAMUSCULAR; INTRAVENOUS at 03:01

## 2020-01-08 RX ADMIN — PROPOFOL 30 MG: 10 INJECTION, EMULSION INTRAVENOUS at 01:01

## 2020-01-08 RX ADMIN — SODIUM CHLORIDE, SODIUM LACTATE, POTASSIUM CHLORIDE, AND CALCIUM CHLORIDE: 600; 310; 30; 20 INJECTION, SOLUTION INTRAVENOUS at 12:01

## 2020-01-08 RX ADMIN — OXYCODONE HYDROCHLORIDE 5 MG: 5 TABLET ORAL at 01:01

## 2020-01-08 RX ADMIN — DEXAMETHASONE SODIUM PHOSPHATE 8 MG: 4 INJECTION, SOLUTION INTRAMUSCULAR; INTRAVENOUS at 01:01

## 2020-01-08 RX ADMIN — MIDAZOLAM HYDROCHLORIDE 2 MG: 1 INJECTION, SOLUTION INTRAMUSCULAR; INTRAVENOUS at 12:01

## 2020-01-08 RX ADMIN — HYDROMORPHONE HYDROCHLORIDE 0.4 MG: 2 INJECTION, SOLUTION INTRAMUSCULAR; INTRAVENOUS; SUBCUTANEOUS at 01:01

## 2020-01-08 RX ADMIN — ONDANSETRON 8 MG: 8 TABLET, ORALLY DISINTEGRATING ORAL at 05:01

## 2020-01-08 RX ADMIN — FENTANYL CITRATE 100 MCG: 50 INJECTION, SOLUTION INTRAMUSCULAR; INTRAVENOUS at 12:01

## 2020-01-08 RX ADMIN — ACETAMINOPHEN 1000 MG: 500 TABLET, FILM COATED ORAL at 10:01

## 2020-01-08 RX ADMIN — ONDANSETRON 4 MG: 2 INJECTION INTRAMUSCULAR; INTRAVENOUS at 01:01

## 2020-01-08 RX ADMIN — LIDOCAINE HYDROCHLORIDE 50 MG: 20 INJECTION, SOLUTION INTRAVENOUS at 12:01

## 2020-01-08 NOTE — ANESTHESIA POSTPROCEDURE EVALUATION
Anesthesia Post Evaluation    Patient: Naila Huerta    Procedure(s) Performed: Procedure(s) (LRB):  ARTHROSCOPY, KNEE (Left)  MENISCECTOMY, KNEE, MEDIAL AND LATERAL (Left)    Final Anesthesia Type: general    Patient location during evaluation: PACU  Patient participation: Yes- Able to Participate  Level of consciousness: awake and alert  Post-procedure vital signs: reviewed and stable  Pain management: adequate  Airway patency: patent    PONV status at discharge: No PONV  Anesthetic complications: no      Cardiovascular status: blood pressure returned to baseline  Respiratory status: unassisted and room air  Hydration status: euvolemic  Follow-up not needed.          Vitals Value Taken Time   /78 1/8/2020  2:00 PM   Temp 36.5 °C (97.7 °F) 1/8/2020  1:39 PM   Pulse 70 1/8/2020  2:07 PM   Resp 16 1/8/2020  2:00 PM   SpO2 93 % 1/8/2020  2:07 PM   Vitals shown include unvalidated device data.      No case tracking events are documented in the log.      Pain/Zac Score: Pain Rating Prior to Med Admin: 9 (1/8/2020  1:58 PM)  Zac Score: 8 (1/8/2020  1:54 PM)

## 2020-01-08 NOTE — DISCHARGE INSTRUCTIONS
Anesthesia: After Your Surgery  Youve just had surgery. During surgery, you received medication called anesthesia to keep you comfortable and pain-free. After surgery, you may experience some pain or nausea. This is common. Here are some tips for feeling better and recovering after surgery.    Going home  Your doctor or nurse will show you how to take care of yourself when you go home. He or she will also answer your questions. Have an adult family member or friend drive you home. For the first 24 hours after your surgery:    · Do not drive or use heavy equipment.  · Do not make important decisions or sign legal documents.  · Avoid alcohol.  · Have someone stay with you, if needed. He or she can watch for problems and help keep you safe.  · Take your time getting up from a seated or lying position. You may experience dizziness for 24 hours.    Be sure to keep all follow-up appointments with your doctor. And rest after your procedure for as long as your doctor tells you to.    Coping with pain  If you have pain after surgery, pain medication will help you feel better. Take it as directed, before pain becomes severe. Also, ask your doctor or pharmacist about other ways to control pain, such as with heat, ice, and relaxation. And follow any other instructions your surgeon or nurse gives you.    URINARY RETENTION  Should you experience a decrease in your urine output or are unable to urinate following surgery, this can be due to the medications given during surgery.  We recommend you going to the nearest Emergency Department.    Tips for taking pain medication  To get the best relief possible, remember these points:    · Pain medications can upset your stomach. Taking them with a little food may help.  · Most pain relievers taken by mouth need at least 20 to 30 minutes to take effect.  · Taking medication on a schedule can help you remember to take it. Try to time your medication so that you can take it before  beginning an activity, such as dressing, walking, or sitting down for dinner.  · Constipation is a common side effect of pain medications. Contact your doctor before taking any medications like laxatives or stool softeners to help relieve constipation. Also ask about any dietary restrictions, because drinking lots of fluids and eating foods like fruits and vegetables that are high in fiber can also help. Remember, dont take laxatives unless your surgeon has prescribed them.  · Mixing alcohol and pain medication can cause dizziness and slow your breathing. It can even be fatal. Dont drink alcohol while taking pain medication.  · Pain medication can slow your reflexes. Dont drive or operate machinery while taking pain medication.    If your health care provider tells you to take acetaminophen to help relieve your pain, ask him or her how much you are supposed to take each day. (Acetaminophen is the generic name for Tylenol and other brand-name pain relievers.) Acetaminophen or other pain relievers may interact with your prescription medicines or other over-the-counter (OTC) drugs. Some prescription medications contain acetaminophen along with other active ingredients. Using both prescription and OTC acetaminophen for pain can cause you to overdose. The FDA recommends that you read the labels on your OTC medications carefully. This will help you to clearly understand the list of active ingredients, dosing instructions, and any warnings. It may also help you avoid taking too much acetaminophen. If you have questions or don't understand the information, ask your pharmacist or health care provider to explain it to you before you take the OTC medication.    Managing nausea  Some people have an upset stomach after surgery. This is often due to anesthesia, pain, pain medications, or the stress of surgery. The following tips will help you manage nausea and get good nutrition as you recover. If you were on a special diet  before surgery, ask your doctor if you should follow it during recovery. These tips may help:    · Dont push yourself to eat. Your body will tell you when to eat and how much.  · Start off with clear liquids and soup. They are easier to digest.  · Progress to semi-solid foods (mashed potatoes, applesauce, and gelatin) as you feel ready.  · Slowly move to solid foods. Dont eat fatty, rich, or spicy foods at first.  · Dont force yourself to have three large meals a day. Instead, eat smaller amounts more often.  · Take pain medications with a small amount of solid food, such as crackers or toast to avoid nausea.      Call your surgeon if    · You feel too sleepy, dizzy, or groggy (medication may be too strong).  · You have side effects like nausea, vomiting, or skin changes (rash, itching, or hives).     © 0463-7395 [employer] [address] [place] [address], [place] [address]. All rights reserved. This information is not intended as a substitute for professional medical care. Always follow your healthcare professional's instructions.    PLEASE FOLLOW ANY OTHER INSTRUCTIONS PROVIDED TO YOU BY DR. ROY!

## 2020-01-08 NOTE — OP NOTE
[facility]  Brief Operative Note    Surgery Date: 1/8/2020     Surgeon(s) and Role:     * Van Monreal MD - Primary    Assisting Surgeon: None    Pre-op Diagnosis: 1.  Acute medial meniscus tear of left knee, subsequent encounter  2.  Acute lateral meniscus tear of left knee, subsequent encounter    Post-op Diagnosis:  Post-Op Diagnosis Codes:     1. Acute medial meniscus tear of left knee, subsequent encounter  2.  Acute lateral meniscus tear of left knee, subsequent encounter  3.  Tricompartmental arthritis, grade 3 left knee     Procedure(s) (LRB):  ARTHROSCOPY, KNEE (Left)  MENISCECTOMY, KNEE, MEDIAL AND LATERAL (Left)    Anesthesia: General    Description of the findings of the procedure(s):  Clinical summary:  74-year-old female status post arthroscopy 2-3 months ago for a medial and lateral meniscal tear.  Was doing well up until about a month ago when she did a significant amount of gardening and was kneeling and squatting quite a bit.  Repeat MRI demonstrated a recurrence medial and lateral meniscal tear.      After the appropriate consents were signed the patient understood and accepted all risks and complications she was brought to the operating room and underwent general anesthesia.  Tourniquet was applied to proximal left leg and left lower extremity was then prepped and draped in a sterile manner.  After exsanguination with an Esmarch bandage the tourniquet was inflated to 300 mm of mercury.  The standard superior medial inflow was established and the knee was inflated with saline.  An anterior lateral portal was established and scope was introduced.  Inspection of the patellofemoral joint demonstrated grade 2 and 3 changes of chondromalacia.  There were no loose bodies.  Inspection of the medial compartment demonstrated a recurrent tear of the posterior horn of the medial meniscus.  An anterior medial portal was established with a spinal needle and a probe was introduced.   Utilizing straight and up-biting basket forceps the posterior horn tear was removed.  There was noted to be grade 3 changes of chondromalacia of the medial compartment.    Inspection of the intercondylar notch demonstrated intact anterior cruciate ligament.  Inspection of the lateral compartment demonstrated some degenerative tearing of the lateral meniscus which was removed with straight basket forceps and the shaver.  There were also grade 2/3 changes of chondromalacia.    The knee was then irrigated out copiously with saline and 10 cc 0.25% Marcaine with a cc of Depo-Medrol were injected into the knee.  The portals were closed with 4 nylon suture and a sterile compressive dressing was applied.  The tourniquet was deflated the patient was extubated and brought to recovery room in good condition.  Estimated Blood Loss:  Minimal         Specimens:   Specimen (12h ago, onward)    None            Discharge Note    OUTCOME: Patient tolerated treatment/procedure well without complication and is now ready for discharge.    DISPOSITION: Home or Self Care    FINAL DIAGNOSIS:  Medial and lateral meniscal tear left knee    FOLLOWUP: In clinic

## 2020-01-09 NOTE — PLAN OF CARE
Naila Huerta has met all discharge criteria from Phase II. Vital Signs are stable, ambulating  without difficulty.Pain is now under control and tolerable for the pt. Pain score 5/10 at this time.  Discharge instructions given, patient verbalized understanding. Discharged from facility via wheelchair in stable condition.

## 2020-02-27 ENCOUNTER — OFFICE VISIT (OUTPATIENT)
Dept: DERMATOLOGY | Facility: CLINIC | Age: 75
End: 2020-02-27
Payer: MEDICARE

## 2020-02-27 VITALS — BODY MASS INDEX: 23.88 KG/M2 | WEIGHT: 152.13 LBS | HEIGHT: 67 IN | RESPIRATION RATE: 16 BRPM

## 2020-02-27 DIAGNOSIS — L91.8 CUTANEOUS SKIN TAGS: ICD-10-CM

## 2020-02-27 DIAGNOSIS — D22.9 MULTIPLE BENIGN NEVI: Primary | ICD-10-CM

## 2020-02-27 DIAGNOSIS — D18.01 CHERRY ANGIOMA: ICD-10-CM

## 2020-02-27 PROCEDURE — 99213 PR OFFICE/OUTPT VISIT, EST, LEVL III, 20-29 MIN: ICD-10-PCS | Mod: S$PBB,,, | Performed by: DERMATOLOGY

## 2020-02-27 PROCEDURE — 99213 OFFICE O/P EST LOW 20 MIN: CPT | Mod: PBBFAC,PO | Performed by: DERMATOLOGY

## 2020-02-27 PROCEDURE — 99999 PR PBB SHADOW E&M-EST. PATIENT-LVL III: CPT | Mod: PBBFAC,,, | Performed by: DERMATOLOGY

## 2020-02-27 PROCEDURE — 99213 OFFICE O/P EST LOW 20 MIN: CPT | Mod: S$PBB,,, | Performed by: DERMATOLOGY

## 2020-02-27 PROCEDURE — 99999 PR PBB SHADOW E&M-EST. PATIENT-LVL III: ICD-10-PCS | Mod: PBBFAC,,, | Performed by: DERMATOLOGY

## 2020-02-27 NOTE — PROGRESS NOTES
Subjective:       Patient ID:  Naila Huerta is a 74 y.o. female who presents for   Chief Complaint   Patient presents with    Skin Check     75 y/o F presents for initial visit for skin check.  She also noticed a rash that began yesterday that has actually improved.  Other than that, pt has no new skin lesions or concerns     No phx skin ca   Extensive sun exposure - outdoors often       Review of Systems   Constitutional: Negative for fever.   HENT: Negative for sore throat and mouth sores.    Eyes: Negative for itching and eye watering.   Respiratory: Negative for cough.    Musculoskeletal: Negative for joint swelling and arthralgias.   Skin: Positive for sensitivity to bandage adhesive. Negative for rash, daily sunscreen use, activity-related sunscreen use, sensitivity to antibiotic ointment and wears hat.   Hematologic/Lymphatic: Bruises/bleeds easily.      Past Medical History:   Diagnosis Date    Hypertension     PONV (postoperative nausea and vomiting)     Swelling     Symptomatic menopausal or female climacteric states        Objective:    Physical Exam   Constitutional: She appears well-developed and well-nourished.   HENT:   Mouth/Throat: Lips normal.    Eyes: Lids are normal.    Neurological: She is alert and oriented to person, place, and time.   Psychiatric: She has a normal mood and affect.   Skin:   Areas Examined (abnormalities noted in diagram):   Head / Face Inspection Performed  Neck Inspection Performed  Chest / Axilla Inspection Performed  Abdomen Inspection Performed  Back Inspection Performed  RUE Inspected  LUE Inspection Performed              Diagram Legend     Erythematous scaling macule/papule c/w actinic keratosis       Vascular papule c/w angioma      Pigmented verrucoid papule/plaque c/w seborrheic keratosis      Yellow umbilicated papule c/w sebaceous hyperplasia      Irregularly shaped tan macule c/w lentigo     1-2 mm smooth white papules consistent with Milia       Movable subcutaneous cyst with punctum c/w epidermal inclusion cyst      Subcutaneous movable cyst c/w pilar cyst      Firm pink to brown papule c/w dermatofibroma      Pedunculated fleshy papule(s) c/w skin tag(s)      Evenly pigmented macule c/w junctional nevus     Mildly variegated pigmented, slightly irregular-bordered macule c/w mildly atypical nevus      Flesh colored to evenly pigmented papule c/w intradermal nevus       Pink pearly papule/plaque c/w basal cell carcinoma      Erythematous hyperkeratotic cursted plaque c/w SCC      Surgical scar with no sign of skin cancer recurrence      Open and closed comedones      Inflammatory papules and pustules      Verrucoid papule consistent consistent with wart     Erythematous eczematous patches and plaques     Dystrophic onycholytic nail with subungual debris c/w onychomycosis     Umbilicated papule    Erythematous-base heme-crusted tan verrucoid plaque consistent with inflamed seborrheic keratosis     Erythematous Silvery Scaling Plaque c/w Psoriasis     See annotation      Assessment / Plan:        Multiple benign nevi  Reassurance that her nevi appear benign with regular and consistent pigment pattern on dermoscopy    Encouraged sun protection while outdoors    Cherry angioma  This is a benign vascular lesion. Reassurance given. No treatment required. '    Cutaneous skin tags  Reassurance    If rash flares again, please notify us and send photos via [facility]         Follow up in about 1 year (around 2/27/2021).

## 2020-06-09 PROBLEM — K21.9 GASTROESOPHAGEAL REFLUX DISEASE WITHOUT ESOPHAGITIS: Status: ACTIVE | Noted: 2020-06-09

## 2020-06-09 PROBLEM — G89.29 CHRONIC PAIN OF RIGHT KNEE: Status: ACTIVE | Noted: 2020-06-09

## 2020-06-09 PROBLEM — M25.561 CHRONIC PAIN OF RIGHT KNEE: Status: ACTIVE | Noted: 2020-06-09

## 2020-06-09 PROBLEM — Z79.890 HORMONE REPLACEMENT THERAPY (HRT): Status: ACTIVE | Noted: 2020-06-09

## 2020-06-12 PROBLEM — E53.8 VITAMIN B12 DEFICIENCY: Status: ACTIVE | Noted: 2020-06-12

## 2020-06-12 PROBLEM — M72.2 BILATERAL PLANTAR FASCIITIS: Status: ACTIVE | Noted: 2020-06-12

## 2020-06-12 PROBLEM — H61.23 BILATERAL IMPACTED CERUMEN: Status: ACTIVE | Noted: 2020-06-12

## 2020-06-12 PROBLEM — E55.9 VITAMIN D DEFICIENCY: Status: ACTIVE | Noted: 2020-06-12

## 2020-12-09 ENCOUNTER — OFFICE VISIT (OUTPATIENT)
Dept: SURGERY | Facility: CLINIC | Age: 75
End: 2020-12-09
Payer: MEDICARE

## 2020-12-09 VITALS
TEMPERATURE: 99 F | WEIGHT: 151.69 LBS | DIASTOLIC BLOOD PRESSURE: 85 MMHG | BODY MASS INDEX: 23.81 KG/M2 | HEIGHT: 67 IN | SYSTOLIC BLOOD PRESSURE: 174 MMHG | HEART RATE: 81 BPM

## 2020-12-09 DIAGNOSIS — K64.9 HEMORRHOIDS, UNSPECIFIED HEMORRHOID TYPE: Primary | ICD-10-CM

## 2020-12-09 PROCEDURE — 99203 PR OFFICE/OUTPT VISIT, NEW, LEVL III, 30-44 MIN: ICD-10-PCS | Mod: 25,S$PBB,, | Performed by: SURGERY

## 2020-12-09 PROCEDURE — 46600 DIAGNOSTIC ANOSCOPY SPX: CPT | Mod: PBBFAC,PO | Performed by: SURGERY

## 2020-12-09 PROCEDURE — 99203 OFFICE O/P NEW LOW 30 MIN: CPT | Mod: 25,S$PBB,, | Performed by: SURGERY

## 2020-12-09 PROCEDURE — 46600 PR DIAG2STIC A2SCOPY: ICD-10-PCS | Mod: S$PBB,,, | Performed by: SURGERY

## 2020-12-09 PROCEDURE — 99999 PR PBB SHADOW E&M-EST. PATIENT-LVL IV: ICD-10-PCS | Mod: PBBFAC,,, | Performed by: SURGERY

## 2020-12-09 PROCEDURE — 99214 OFFICE O/P EST MOD 30 MIN: CPT | Mod: PBBFAC,PO | Performed by: SURGERY

## 2020-12-09 PROCEDURE — 99999 PR PBB SHADOW E&M-EST. PATIENT-LVL IV: CPT | Mod: PBBFAC,,, | Performed by: SURGERY

## 2020-12-09 PROCEDURE — 46600 DIAGNOSTIC ANOSCOPY SPX: CPT | Mod: S$PBB,,, | Performed by: SURGERY

## 2020-12-09 RX ORDER — HYDROCORTISONE 25 MG/G
CREAM TOPICAL
COMMUNITY
Start: 2020-11-04 | End: 2022-04-11

## 2020-12-09 NOTE — PROGRESS NOTES
Subjective:       Patient ID: Naila Huerta is a 75 y.o. female.    Chief Complaint: Consult (Rectal itching)    HPI  pleasant 75-year-old female who I have seen in the remote past.  Patient presents today complaining of perianal pruritus is been going on for the last 4-6 weeks.  She states that it is most prevalent in the evening or at night.  States itching will keep her awake.  Notes irritation but no significant pain or discomfort.  She has had no significant changes in bowel habits.  Denies any blood per rectum.  She has had no fevers or chills.  Patient notes having had a colonoscopy within the last year.  She has no other complaints.  Patient with no other significant medical or surgical history  Review of Systems   Constitutional: Negative for activity change, appetite change, fever and unexpected weight change.   Respiratory: Negative for chest tightness, shortness of breath and wheezing.    Cardiovascular: Negative for chest pain.   Gastrointestinal: Negative for abdominal distention, abdominal pain, anal bleeding, blood in stool, constipation, diarrhea, nausea, rectal pain and vomiting.        Perianal pruritus   Genitourinary: Negative for difficulty urinating, dysuria and frequency.   Integumentary:  Negative for wound.   Neurological: Negative for dizziness.   Hematological: Negative for adenopathy.   Psychiatric/Behavioral: Negative for agitation.         Objective:      Physical Exam  Vitals signs reviewed.   Constitutional:       Appearance: She is well-developed.   HENT:      Head: Normocephalic and atraumatic.   Eyes:      Pupils: Pupils are equal, round, and reactive to light.   Neck:      Musculoskeletal: Normal range of motion and neck supple.      Thyroid: No thyromegaly.      Trachea: No tracheal deviation.   Cardiovascular:      Rate and Rhythm: Normal rate.   Pulmonary:      Effort: Pulmonary effort is normal.   Chest:      Chest wall: No tenderness.   Abdominal:      General:  Bowel sounds are normal. There is no distension or abdominal bruit.      Palpations: Abdomen is soft. Abdomen is not rigid. There is no mass or pulsatile mass.      Tenderness: There is no abdominal tenderness. There is no guarding or rebound. Negative signs include Abarca's sign and McBurney's sign.      Hernia: No hernia is present. There is no hernia in the ventral area.   Genitourinary:     Comments: External exam does demonstrate slight external hemorrhoids in the right posterior position.  Digital rectal exam with normal sphincter tone.  Anoscopy performed does demonstrate moderate to large internal hemorrhoids in the right anterior, right posterior and left lateral columns.  Musculoskeletal: Normal range of motion.   Skin:     General: Skin is warm.      Findings: No erythema or rash.   Neurological:      Mental Status: She is alert and oriented to person, place, and time.         Assessment:     hemorrhoids  No diagnosis found.    Plan:       Discussion with patient.  Suspect much for itching is related to likely prolapsing hemorrhoids.  I have recommended using fiber twice a day.  In particular recommended FiberCon.  She will follow up with me in 6-8 weeks.  If symptoms persist with consider banding at that time.

## 2021-01-09 ENCOUNTER — IMMUNIZATION (OUTPATIENT)
Dept: FAMILY MEDICINE | Facility: CLINIC | Age: 76
End: 2021-01-09
Payer: MEDICARE

## 2021-01-09 DIAGNOSIS — Z23 NEED FOR VACCINATION: ICD-10-CM

## 2021-01-09 PROCEDURE — 91300 COVID-19, MRNA, LNP-S, PF, 30 MCG/0.3 ML DOSE VACCINE: CPT | Mod: PBBFAC,PO

## 2021-01-30 ENCOUNTER — IMMUNIZATION (OUTPATIENT)
Dept: FAMILY MEDICINE | Facility: CLINIC | Age: 76
End: 2021-01-30
Payer: MEDICARE

## 2021-01-30 DIAGNOSIS — Z23 NEED FOR VACCINATION: Primary | ICD-10-CM

## 2021-01-30 PROCEDURE — 0002A COVID-19, MRNA, LNP-S, PF, 30 MCG/0.3 ML DOSE VACCINE: CPT | Mod: PBBFAC,PO

## 2021-01-30 PROCEDURE — 91300 COVID-19, MRNA, LNP-S, PF, 30 MCG/0.3 ML DOSE VACCINE: CPT | Mod: PBBFAC,PO

## 2021-05-31 ENCOUNTER — OFFICE VISIT (OUTPATIENT)
Dept: CARDIOLOGY | Facility: CLINIC | Age: 76
End: 2021-05-31
Payer: MEDICARE

## 2021-05-31 VITALS
SYSTOLIC BLOOD PRESSURE: 153 MMHG | HEART RATE: 80 BPM | WEIGHT: 153.69 LBS | DIASTOLIC BLOOD PRESSURE: 87 MMHG | HEIGHT: 67 IN | BODY MASS INDEX: 24.12 KG/M2

## 2021-05-31 DIAGNOSIS — I35.8 AORTIC VALVE SCLEROSIS: ICD-10-CM

## 2021-05-31 DIAGNOSIS — I10 ESSENTIAL HYPERTENSION: ICD-10-CM

## 2021-05-31 DIAGNOSIS — I77.1 STRICTURE OF ARTERY: ICD-10-CM

## 2021-05-31 DIAGNOSIS — I05.9 MITRAL VALVE DISEASE: Primary | ICD-10-CM

## 2021-05-31 PROCEDURE — 99204 PR OFFICE/OUTPT VISIT, NEW, LEVL IV, 45-59 MIN: ICD-10-PCS | Mod: S$PBB,,, | Performed by: INTERNAL MEDICINE

## 2021-05-31 PROCEDURE — 99999 PR PBB SHADOW E&M-EST. PATIENT-LVL IV: CPT | Mod: PBBFAC,,, | Performed by: INTERNAL MEDICINE

## 2021-05-31 PROCEDURE — 99204 OFFICE O/P NEW MOD 45 MIN: CPT | Mod: S$PBB,,, | Performed by: INTERNAL MEDICINE

## 2021-05-31 PROCEDURE — 99999 PR PBB SHADOW E&M-EST. PATIENT-LVL IV: ICD-10-PCS | Mod: PBBFAC,,, | Performed by: INTERNAL MEDICINE

## 2021-05-31 PROCEDURE — 99214 OFFICE O/P EST MOD 30 MIN: CPT | Mod: PBBFAC,PO | Performed by: INTERNAL MEDICINE

## 2021-05-31 RX ORDER — CLONIDINE HYDROCHLORIDE 0.1 MG/1
0.1 TABLET ORAL 3 TIMES DAILY PRN
Qty: 90 TABLET | Refills: 6 | Status: SHIPPED | OUTPATIENT
Start: 2021-05-31 | End: 2022-04-11 | Stop reason: ALTCHOICE

## 2021-05-31 RX ORDER — TELMISARTAN 40 MG/1
40 TABLET ORAL DAILY
Qty: 90 TABLET | Refills: 3 | Status: SHIPPED | OUTPATIENT
Start: 2021-05-31 | End: 2021-06-14

## 2021-05-31 RX ORDER — AMLODIPINE BESYLATE 2.5 MG/1
2.5 TABLET ORAL DAILY
Qty: 90 TABLET | Refills: 5 | Status: SHIPPED | OUTPATIENT
Start: 2021-05-31 | End: 2021-06-14

## 2021-06-10 ENCOUNTER — TELEPHONE (OUTPATIENT)
Dept: CARDIOLOGY | Facility: CLINIC | Age: 76
End: 2021-06-10

## 2021-06-23 ENCOUNTER — HOSPITAL ENCOUNTER (OUTPATIENT)
Dept: CARDIOLOGY | Facility: HOSPITAL | Age: 76
Discharge: HOME | End: 2021-06-23
Attending: INTERNAL MEDICINE
Payer: MEDICARE

## 2021-06-23 VITALS — BODY MASS INDEX: 23.54 KG/M2 | HEIGHT: 67 IN | WEIGHT: 150 LBS

## 2021-06-23 DIAGNOSIS — I77.1 STRICTURE OF ARTERY: ICD-10-CM

## 2021-06-23 DIAGNOSIS — I35.8 AORTIC VALVE SCLEROSIS: ICD-10-CM

## 2021-06-23 DIAGNOSIS — I10 ESSENTIAL HYPERTENSION: ICD-10-CM

## 2021-06-23 DIAGNOSIS — I05.9 MITRAL VALVE DISEASE: ICD-10-CM

## 2021-06-23 LAB
ABDOMINAL IMA AP: 1.5 CM
ABDOMINAL IMA PS VEL: 80 CM/S
ABDOMINAL IMA TRANS: 1.6 CM
ABDOMINAL INFRARENAL AORTA AP: 1.6 CM
ABDOMINAL INFRARENAL AORTA PS VEL: 72 CM/S
ABDOMINAL INFRARENAL AORTA TRANS: 1.6 CM
ABDOMINAL JUXTARENAL AORTA AP: 1.8 CM
ABDOMINAL JUXTARENAL AORTA ED VEL: 18 CM/S
ABDOMINAL JUXTARENAL AORTA PS VEL: 73 CM/S
ABDOMINAL JUXTARENAL AORTA TRANS: 1.8 CM
ABDOMINAL LT COM ILIAC AP: 1 CM
ABDOMINAL LT COM ILIAC TRANS: 0.9 CM
ABDOMINAL LT COM ILIAC VEL: 96 CM/S
ABDOMINAL RT COM ILIAC AP: 0.9 CM
ABDOMINAL RT COM ILIAC TRANS: 1 CM
ABDOMINAL RT COM ILIAC VEL: 68 CM/S
ABDOMINAL SUPRARENAL AORTA AP: 1.8 CM
ABDOMINAL SUPRARENAL AORTA ED VEL: 16 CM/S
ABDOMINAL SUPRARENAL AORTA PS VEL: 65 CM/S
ABDOMINAL SUPRARENAL AORTA TRANS: 1.8 CM
ASCENDING AORTA: 2.9 CM
AV INDEX (PROSTH): 0.76
AV MEAN GRADIENT: 7 MMHG
AV PEAK GRADIENT: 12 MMHG
AV VALVE AREA: 2.48 CM2
AV VELOCITY RATIO: 0.8
BSA FOR ECHO PROCEDURE: 1.79 M2
CV ECHO LV RWT: 0.36 CM
DOP CALC AO PEAK VEL: 1.74 M/S
DOP CALC AO VTI: 41.9 CM
DOP CALC LVOT AREA: 3.3 CM2
DOP CALC LVOT DIAMETER: 2.04 CM
DOP CALC LVOT PEAK VEL: 1.4 M/S
DOP CALC LVOT STROKE VOLUME: 103.92 CM3
DOP CALCLVOT PEAK VEL VTI: 31.81 CM
E WAVE DECELERATION TIME: 212.67 MSEC
E/A RATIO: 0.93
E/E' RATIO: 11 M/S
ECHO LV POSTERIOR WALL: 0.79 CM (ref 0.6–1.1)
EJECTION FRACTION: 65 %
FRACTIONAL SHORTENING: 50 % (ref 28–44)
INTERVENTRICULAR SEPTUM: 0.74 CM (ref 0.6–1.1)
IVRT: 102.76 MSEC
LA MAJOR: 4.33 CM
LA MINOR: 4.53 CM
LA WIDTH: 2.88 CM
LEFT ARM DIASTOLIC BLOOD PRESSURE: 66 MMHG
LEFT ARM SYSTOLIC BLOOD PRESSURE: 130 MMHG
LEFT ATRIUM SIZE: 3.43 CM
LEFT ATRIUM VOLUME INDEX: 20.8 ML/M2
LEFT ATRIUM VOLUME: 37.18 CM3
LEFT CBA DIAS: 21 CM/S
LEFT CBA SYS: 63 CM/S
LEFT CCA DIST DIAS: 26 CM/S
LEFT CCA DIST SYS: 65 CM/S
LEFT CCA MID DIAS: 22 CM/S
LEFT CCA MID SYS: 67 CM/S
LEFT CCA PROX DIAS: 20 CM/S
LEFT CCA PROX SYS: 68 CM/S
LEFT ECA DIAS: 13 CM/S
LEFT ECA SYS: 64 CM/S
LEFT ICA DIST DIAS: 23 CM/S
LEFT ICA DIST SYS: 99 CM/S
LEFT ICA MID DIAS: 47 CM/S
LEFT ICA MID SYS: 129 CM/S
LEFT ICA PROX DIAS: 21 CM/S
LEFT ICA PROX SYS: 61 CM/S
LEFT INTERNAL DIMENSION IN SYSTOLE: 2.2 CM (ref 2.1–4)
LEFT VENTRICLE DIASTOLIC VOLUME INDEX: 48.94 ML/M2
LEFT VENTRICLE DIASTOLIC VOLUME: 87.6 ML
LEFT VENTRICLE MASS INDEX: 58 G/M2
LEFT VENTRICLE SYSTOLIC VOLUME INDEX: 9.1 ML/M2
LEFT VENTRICLE SYSTOLIC VOLUME: 16.25 ML
LEFT VENTRICULAR INTERNAL DIMENSION IN DIASTOLE: 4.4 CM (ref 3.5–6)
LEFT VENTRICULAR MASS: 103.22 G
LEFT VERTEBRAL DIAS: 20 CM/S
LEFT VERTEBRAL SYS: 57 CM/S
LV LATERAL E/E' RATIO: 9.78 M/S
LV SEPTAL E/E' RATIO: 12.57 M/S
MV A" WAVE DURATION": 9.99 MSEC
MV PEAK A VEL: 0.95 M/S
MV PEAK E VEL: 0.88 M/S
OHS CV CAROTID RIGHT ICA EDV HIGHEST: 40
OHS CV CAROTID ULTRASOUND LEFT ICA/CCA RATIO: 1.98
OHS CV CAROTID ULTRASOUND RIGHT ICA/CCA RATIO: 1.88
OHS CV PV CAROTID LEFT HIGHEST CCA: 68
OHS CV PV CAROTID LEFT HIGHEST ICA: 129
OHS CV PV CAROTID RIGHT HIGHEST CCA: 64
OHS CV PV CAROTID RIGHT HIGHEST ICA: 105
OHS CV US CAROTID LEFT HIGHEST EDV: 47
PISA MRMAX VEL: 0.06 M/S
PISA TR MAX VEL: 2.9 M/S
PULM VEIN S/D RATIO: 1.48
PV PEAK D VEL: 0.42 M/S
PV PEAK S VEL: 0.62 M/S
RA MAJOR: 4.45 CM
RA PRESSURE: 3 MMHG
RA WIDTH: 3.22 CM
RIGHT ARM DIASTOLIC BLOOD PRESSURE: 64 MMHG
RIGHT ARM SYSTOLIC BLOOD PRESSURE: 130 MMHG
RIGHT CBA DIAS: 11 CM/S
RIGHT CBA SYS: 40 CM/S
RIGHT CCA DIST DIAS: 16 CM/S
RIGHT CCA DIST SYS: 56 CM/S
RIGHT CCA MID DIAS: 11 CM/S
RIGHT CCA MID SYS: 45 CM/S
RIGHT CCA PROX DIAS: 3 CM/S
RIGHT CCA PROX SYS: 64 CM/S
RIGHT ECA DIAS: 11 CM/S
RIGHT ECA SYS: 57 CM/S
RIGHT ICA DIST DIAS: 37 CM/S
RIGHT ICA DIST SYS: 103 CM/S
RIGHT ICA MID DIAS: 40 CM/S
RIGHT ICA MID SYS: 105 CM/S
RIGHT ICA PROX DIAS: 18 CM/S
RIGHT ICA PROX SYS: 48 CM/S
RIGHT VENTRICULAR END-DIASTOLIC DIMENSION: 4.55 CM
RIGHT VERTEBRAL DIAS: 18 CM/S
RIGHT VERTEBRAL SYS: 59 CM/S
RV TISSUE DOPPLER FREE WALL SYSTOLIC VELOCITY 1 (APICAL 4 CHAMBER VIEW): 12.24 CM/S
SINUS: 2.87 CM
STJ: 2.37 CM
TDI LATERAL: 0.09 M/S
TDI SEPTAL: 0.07 M/S
TDI: 0.08 M/S
TR MAX PG: 34 MMHG
TRICUSPID ANNULAR PLANE SYSTOLIC EXCURSION: 2.41 CM
TV REST PULMONARY ARTERY PRESSURE: 37 MMHG

## 2021-06-23 PROCEDURE — 93880 CV US DOPPLER CAROTID (CUPID ONLY): ICD-10-PCS | Mod: 26,,, | Performed by: INTERNAL MEDICINE

## 2021-06-23 PROCEDURE — 93306 TTE W/DOPPLER COMPLETE: CPT | Mod: PO

## 2021-06-23 PROCEDURE — 93306 ECHO (CUPID ONLY): ICD-10-PCS | Mod: 26,,, | Performed by: INTERNAL MEDICINE

## 2021-06-23 PROCEDURE — 93880 EXTRACRANIAL BILAT STUDY: CPT | Mod: PO

## 2021-06-23 PROCEDURE — 93978 CV US ABDOMINAL AORTA EVALUATION (CUPID ONLY): ICD-10-PCS | Mod: 26,,, | Performed by: INTERNAL MEDICINE

## 2021-06-23 PROCEDURE — 93880 EXTRACRANIAL BILAT STUDY: CPT | Mod: 26,,, | Performed by: INTERNAL MEDICINE

## 2021-06-23 PROCEDURE — 93978 VASCULAR STUDY: CPT | Mod: PO

## 2021-06-23 PROCEDURE — 93306 TTE W/DOPPLER COMPLETE: CPT | Mod: 26,,, | Performed by: INTERNAL MEDICINE

## 2021-06-23 PROCEDURE — 93978 VASCULAR STUDY: CPT | Mod: 26,,, | Performed by: INTERNAL MEDICINE

## 2021-07-09 ENCOUNTER — TELEPHONE (OUTPATIENT)
Dept: CARDIOLOGY | Facility: CLINIC | Age: 76
End: 2021-07-09

## 2021-12-11 PROBLEM — E55.9 VITAMIN D DEFICIENCY: Chronic | Status: ACTIVE | Noted: 2020-06-12

## 2021-12-11 PROBLEM — K21.9 GASTROESOPHAGEAL REFLUX DISEASE WITHOUT ESOPHAGITIS: Chronic | Status: ACTIVE | Noted: 2020-06-09

## 2021-12-11 PROBLEM — E53.8 VITAMIN B12 DEFICIENCY: Chronic | Status: ACTIVE | Noted: 2020-06-12

## 2021-12-11 PROBLEM — I36.1 NONRHEUMATIC TRICUSPID VALVE REGURGITATION: Chronic | Status: ACTIVE | Noted: 2021-12-11

## 2021-12-30 ENCOUNTER — TELEPHONE (OUTPATIENT)
Dept: RHEUMATOLOGY | Facility: CLINIC | Age: 76
End: 2021-12-30
Payer: MEDICARE

## 2021-12-31 PROBLEM — R23.3 EASY BRUISING: Chronic | Status: ACTIVE | Noted: 2021-12-31

## 2022-01-03 PROBLEM — I77.1 STRICTURE OF ARTERY: Status: ACTIVE | Noted: 2022-01-03

## 2022-03-10 ENCOUNTER — PATIENT MESSAGE (OUTPATIENT)
Dept: RHEUMATOLOGY | Facility: CLINIC | Age: 77
End: 2022-03-10
Payer: MEDICARE

## 2022-04-05 ENCOUNTER — OFFICE VISIT (OUTPATIENT)
Dept: RHEUMATOLOGY | Facility: CLINIC | Age: 77
End: 2022-04-05
Payer: MEDICARE

## 2022-04-05 ENCOUNTER — LAB VISIT (OUTPATIENT)
Dept: LAB | Facility: HOSPITAL | Age: 77
End: 2022-04-05
Attending: INTERNAL MEDICINE
Payer: MEDICARE

## 2022-04-05 VITALS
SYSTOLIC BLOOD PRESSURE: 138 MMHG | DIASTOLIC BLOOD PRESSURE: 86 MMHG | BODY MASS INDEX: 23.09 KG/M2 | WEIGHT: 147.38 LBS

## 2022-04-05 DIAGNOSIS — L65.9 HAIR LOSS: ICD-10-CM

## 2022-04-05 DIAGNOSIS — M25.561 CHRONIC ARTHRALGIAS OF KNEES AND HIPS: Primary | ICD-10-CM

## 2022-04-05 DIAGNOSIS — M19.90 OSTEOARTHRITIS, UNSPECIFIED OSTEOARTHRITIS TYPE, UNSPECIFIED SITE: ICD-10-CM

## 2022-04-05 DIAGNOSIS — M25.552 CHRONIC ARTHRALGIAS OF KNEES AND HIPS: Primary | ICD-10-CM

## 2022-04-05 DIAGNOSIS — M25.562 CHRONIC ARTHRALGIAS OF KNEES AND HIPS: Primary | ICD-10-CM

## 2022-04-05 DIAGNOSIS — M25.551 CHRONIC ARTHRALGIAS OF KNEES AND HIPS: Primary | ICD-10-CM

## 2022-04-05 DIAGNOSIS — G89.29 CHRONIC ARTHRALGIAS OF KNEES AND HIPS: Primary | ICD-10-CM

## 2022-04-05 LAB
ALBUMIN SERPL BCP-MCNC: 3.8 G/DL (ref 3.5–5.2)
ALP SERPL-CCNC: 41 U/L (ref 55–135)
ALT SERPL W/O P-5'-P-CCNC: 31 U/L (ref 10–44)
ANION GAP SERPL CALC-SCNC: 9 MMOL/L (ref 8–16)
AST SERPL-CCNC: 28 U/L (ref 10–40)
BASOPHILS # BLD AUTO: 0.05 K/UL (ref 0–0.2)
BASOPHILS NFR BLD: 1.1 % (ref 0–1.9)
BILIRUB SERPL-MCNC: 0.7 MG/DL (ref 0.1–1)
BILIRUB UR QL STRIP: NEGATIVE
BUN SERPL-MCNC: 21 MG/DL (ref 8–23)
CALCIUM SERPL-MCNC: 9.1 MG/DL (ref 8.7–10.5)
CHLORIDE SERPL-SCNC: 100 MMOL/L (ref 95–110)
CLARITY UR: CLEAR
CO2 SERPL-SCNC: 27 MMOL/L (ref 23–29)
COLOR UR: YELLOW
CREAT SERPL-MCNC: 0.9 MG/DL (ref 0.5–1.4)
CRP SERPL-MCNC: 0.49 MG/DL
DIFFERENTIAL METHOD: ABNORMAL
EOSINOPHIL # BLD AUTO: 0.1 K/UL (ref 0–0.5)
EOSINOPHIL NFR BLD: 2.8 % (ref 0–8)
ERYTHROCYTE [DISTWIDTH] IN BLOOD BY AUTOMATED COUNT: 13.1 % (ref 11.5–14.5)
ERYTHROCYTE [SEDIMENTATION RATE] IN BLOOD BY WESTERGREN METHOD: 1 MM/HR (ref 0–20)
EST. GFR  (AFRICAN AMERICAN): >60 ML/MIN/1.73 M^2
EST. GFR  (NON AFRICAN AMERICAN): >60 ML/MIN/1.73 M^2
GLUCOSE SERPL-MCNC: 94 MG/DL (ref 70–110)
GLUCOSE UR QL STRIP: NEGATIVE
HCT VFR BLD AUTO: 40.1 % (ref 37–48.5)
HGB BLD-MCNC: 13.3 G/DL (ref 12–16)
HGB UR QL STRIP: NEGATIVE
IMM GRANULOCYTES # BLD AUTO: 0.01 K/UL (ref 0–0.04)
IMM GRANULOCYTES NFR BLD AUTO: 0.2 % (ref 0–0.5)
KETONES UR QL STRIP: NEGATIVE
LEUKOCYTE ESTERASE UR QL STRIP: NEGATIVE
LYMPHOCYTES # BLD AUTO: 1.1 K/UL (ref 1–4.8)
LYMPHOCYTES NFR BLD: 22.8 % (ref 18–48)
MCH RBC QN AUTO: 31.1 PG (ref 27–31)
MCHC RBC AUTO-ENTMCNC: 33.2 G/DL (ref 32–36)
MCV RBC AUTO: 94 FL (ref 82–98)
MONOCYTES # BLD AUTO: 0.7 K/UL (ref 0.3–1)
MONOCYTES NFR BLD: 14.3 % (ref 4–15)
NEUTROPHILS # BLD AUTO: 2.8 K/UL (ref 1.8–7.7)
NEUTROPHILS NFR BLD: 58.8 % (ref 38–73)
NITRITE UR QL STRIP: NEGATIVE
NRBC BLD-RTO: 0 /100 WBC
PH UR STRIP: 6 [PH] (ref 5–8)
PLATELET # BLD AUTO: 241 K/UL (ref 150–450)
PMV BLD AUTO: 9.2 FL (ref 9.2–12.9)
POTASSIUM SERPL-SCNC: 4.5 MMOL/L (ref 3.5–5.1)
PROT SERPL-MCNC: 7 G/DL (ref 6–8.4)
PROT UR QL STRIP: NEGATIVE
RBC # BLD AUTO: 4.28 M/UL (ref 4–5.4)
SODIUM SERPL-SCNC: 136 MMOL/L (ref 136–145)
SP GR UR STRIP: 1.02 (ref 1–1.03)
T4 FREE SERPL-MCNC: 1.3 NG/DL (ref 0.71–1.51)
TSH SERPL DL<=0.005 MIU/L-ACNC: 1.5 UIU/ML (ref 0.34–5.6)
URATE SERPL-MCNC: 5.3 MG/DL (ref 2.4–5.7)
URN SPEC COLLECT METH UR: NORMAL
UROBILINOGEN UR STRIP-ACNC: NEGATIVE EU/DL
WBC # BLD AUTO: 4.7 K/UL (ref 3.9–12.7)

## 2022-04-05 PROCEDURE — 99213 PR OFFICE/OUTPT VISIT, EST, LEVL III, 20-29 MIN: ICD-10-PCS | Mod: S$GLB,,, | Performed by: INTERNAL MEDICINE

## 2022-04-05 PROCEDURE — 99213 OFFICE O/P EST LOW 20 MIN: CPT | Mod: S$GLB,,, | Performed by: INTERNAL MEDICINE

## 2022-04-05 PROCEDURE — 84439 ASSAY OF FREE THYROXINE: CPT | Performed by: INTERNAL MEDICINE

## 2022-04-05 PROCEDURE — 84550 ASSAY OF BLOOD/URIC ACID: CPT | Performed by: INTERNAL MEDICINE

## 2022-04-05 PROCEDURE — 80053 COMPREHEN METABOLIC PANEL: CPT | Performed by: INTERNAL MEDICINE

## 2022-04-05 PROCEDURE — 84443 ASSAY THYROID STIM HORMONE: CPT | Performed by: INTERNAL MEDICINE

## 2022-04-05 PROCEDURE — 86235 NUCLEAR ANTIGEN ANTIBODY: CPT | Performed by: INTERNAL MEDICINE

## 2022-04-05 PROCEDURE — 85651 RBC SED RATE NONAUTOMATED: CPT | Performed by: INTERNAL MEDICINE

## 2022-04-05 PROCEDURE — 85025 COMPLETE CBC W/AUTO DIFF WBC: CPT | Performed by: INTERNAL MEDICINE

## 2022-04-05 PROCEDURE — 81003 URINALYSIS AUTO W/O SCOPE: CPT | Performed by: INTERNAL MEDICINE

## 2022-04-05 PROCEDURE — 36415 COLL VENOUS BLD VENIPUNCTURE: CPT | Performed by: INTERNAL MEDICINE

## 2022-04-05 PROCEDURE — 86140 C-REACTIVE PROTEIN: CPT | Performed by: INTERNAL MEDICINE

## 2022-04-05 PROCEDURE — 86038 ANTINUCLEAR ANTIBODIES: CPT | Performed by: INTERNAL MEDICINE

## 2022-04-05 RX ORDER — CELECOXIB 200 MG/1
200 CAPSULE ORAL
COMMUNITY
Start: 2022-03-09 | End: 2022-11-16 | Stop reason: ALTCHOICE

## 2022-04-05 NOTE — PROGRESS NOTES
[facility] RHEUMATOLOGY        NEW PATIENT      Subjective:       Patient ID:   NAME: Naila Huerta : 1945     76 y.o. female    Referring Doc: No ref. provider found  Other Physicians:    Chief Complaint:  Joint Pain      History of Present Illness:     New patient , last seen 18 yrs ago  Has Osteoarthritis, more symptomatic in knees,thumbs  She is concerned about hair loss over last few years.  No fevers or chest pains.   No cough or shortness of breath.  Was chronic back pain and paresthesias in both feet      ROS:   GEN: no fevers night sweats or significant weight changes  -  fatigue  HEENT: no HA's, changes in vision , no mouth ulcers, no sicca symptoms, no scalp tenderness, jaw claudication  CV: no CP, SOB, PND, ALCANTARA or orthopnea,no palpitations  PULM:no SOB, cough, hemoptysis, sputum or pleuritic pain  GI: no abdominal pain, nausea, vomiting, constipation, diarrhea, melanotic stools, BRBPR, or hematemesis, + occ dysphagia with GERD  : no hematuria, dysuria  NEURO:+ paresthesias feet ( was told sec to back issues), headaches, visual disturbances, muscle weakness  SKIN:  no rashes , erythema, bruising, or swelling,  Raynauds, no photosensitivity  MUSCULOSKELETAL:no joint swelling, no prolonged AM stiffness, + chronic back pain   PSYCH: -   Insomnia, -  depression, - anxiety    Medications:    Current Outpatient Medications:     ascorbic acid, vitamin C, (VITAMIN C) 1000 MG tablet, Take 1,000 mg by mouth once daily., Disp: , Rfl:     buPROPion (WELLBUTRIN XL) 150 MG TB24 tablet, Take 1 tablet (150 mg total) by mouth once daily., Disp: 30 tablet, Rfl: 11    celecoxib (CELEBREX) 200 MG capsule, Take 200 mg by mouth as needed., Disp: , Rfl:     cyanocobalamin, vitamin B-12, (VITAMIN B-12) 2,500 mcg Subl, Place 1 tablet under the tongue once daily., Disp: , Rfl:     estradioL (ESTRACE) 2 MG tablet, Take 2 mg by mouth Daily. Every day, Disp: , Rfl:     olmesartan (BENICAR) 20 MG  tablet, Take 20 mg by mouth once daily., Disp: , Rfl:     prenatal vit calc,iron,folic (PRENATAL VITAMIN ORAL), Take by mouth., Disp: , Rfl:     spironolactone (ALDACTONE) 25 MG tablet, Take 25 mg by mouth once daily., Disp: , Rfl:     vitamin D 1000 units Tab, Take 1,000 Units by mouth once daily., Disp: , Rfl:     azelastine (ASTELIN) 137 mcg (0.1 %) nasal spray, INHALE 1 SPRAY IN EACH NOSTRIL TWO TIMES A DAY, Disp: , Rfl:     BIOTIN ORAL, Take by mouth., Disp: , Rfl:     buPROPion (WELLBUTRIN) 75 MG tablet, Take 1 tablet (75 mg total) by mouth 2 (two) times daily. (Patient not taking: Reported on 4/5/2022), Disp: 20 tablet, Rfl: 0    cloNIDine (CATAPRES) 0.1 MG tablet, Take 1 tablet (0.1 mg total) by mouth 3 (three) times daily as needed (PRN SBP > 165 mmHg). (Patient not taking: Reported on 4/5/2022), Disp: 90 tablet, Rfl: 6    fluticasone-salmeterol 230-21 mcg/dose (ADVAIR HFA) 230-21 mcg/actuation HFAA inhaler, Inhale 2 puffs into the lungs 2 (two) times daily. Controller (Patient not taking: Reported on 4/5/2022), Disp: 12 g, Rfl: 1    hydroCHLOROthiazide (HYDRODIURIL) 25 MG tablet, Take 12.5 mg by mouth once daily., Disp: , Rfl:     hydrocortisone 2.5 % cream, , Disp: , Rfl:     omeprazole (PRILOSEC) 20 MG capsule, Take 20 mg by mouth once daily., Disp: , Rfl:     FAMILY HISTORY: negative for Connective Tissue Disease    PAST MEDICAL HISTORY:  OA  GERD..has   COVID Dec 2021. No pneumonia  HTN  PAST SURGICAL HISTORY:  Hysterectomy  Meniscal tear repair both knees  SOCIAL HISTORY:  Work: retired from nursing  Smoking: none  ETOH  moderately 1 martini in evening     ALLERGIES:          Objective:     Vitals:  Blood pressure 138/86, weight 66.9 kg (147 lb 6.4 oz).    Physical Examination:   GEN: no apparent distress, comfortable; AAOx3  SKIN: no rashes, no lesions, no sclerodactyly or induration, no Raynaud's, no periungual erythema  HEAD: normal  EYES: no pallor, no icterus  NECK: no masses, thyroid  normal, trachea midline, no LAD/LN's, supple  CV:   S1 and S2 regular, no murmurs, gallop or rubs  CHEST: Normal respiratory effort;  normal breath sounds; no rubs, no wheezes, no crackles.   MUSC/Skeletal: ROM normal; no crepitus; joints without synovitis, no deformities   EXTREM: no clubbing, cyanosis, edema, normal pulses.  NEURO: grossly intact; motorWNL; AAOx3; no tremors  PSYCH: normal mood, affect and behavior  LYMPH: normal cervical, supraclavicular            Labs:   @RESUFAST(WBC,HGB,HCT,MCV,PLT)  )@RESUFAST(NA,K,CL,CO2,GLU,BUN,Creatinine,Calcium,PROT,Albumin,Bilitot,Alkphos,AST,ALT,CONCHA,Sed Rate,CRP,RF,CCP)      Radiology/Diagnostic Studies:    I have reviewed all available labs and XRay reports    Assessment/Plan:   76 y.o. female with osteoarthritis.  This appears stable  Hair loss over last few years RO hypothyroid state.Doubt CTD  Labs  FU in 3 wks          PLAN:          Discussion:     I have explained all of the above in detail and the patient understands all of the current recommendation(s). I have answered all of their questions to the best of my ability and to their complete satisfaction.      I have reviewed the risks and benefits of the medication in detail with patient, who understands and wishes to proceed. Printed information regarding the disease and/or medication was also provided.        RTC        Electronically signed by Shelton Keller MD

## 2022-04-06 LAB
ANA TITR SER IF: NEGATIVE {TITER}
ENA SS-A AB SER-ACNC: <0.2 AI (ref 0–0.9)

## 2022-04-11 PROBLEM — I48.11 LONGSTANDING PERSISTENT ATRIAL FIBRILLATION: Status: ACTIVE | Noted: 2022-04-11

## 2022-07-06 PROBLEM — Z79.890 HORMONE REPLACEMENT THERAPY (HRT): Chronic | Status: ACTIVE | Noted: 2020-06-09

## 2022-07-14 PROBLEM — F41.9 ANXIETY: Status: ACTIVE | Noted: 2022-07-14

## 2022-10-26 ENCOUNTER — HOSPITAL ENCOUNTER (OUTPATIENT)
Dept: RADIOLOGY | Facility: HOSPITAL | Age: 77
Discharge: HOME | End: 2022-10-26
Attending: ORTHOPAEDIC SURGERY
Payer: MEDICARE

## 2022-10-26 ENCOUNTER — OFFICE VISIT (OUTPATIENT)
Dept: ORTHOPEDICS | Facility: CLINIC | Age: 77
End: 2022-10-26
Payer: MEDICARE

## 2022-10-26 DIAGNOSIS — M19.049 ARTHRITIS OF HAND: ICD-10-CM

## 2022-10-26 DIAGNOSIS — M19.049 ARTHRITIS OF HAND: Primary | ICD-10-CM

## 2022-10-26 PROCEDURE — 99213 OFFICE O/P EST LOW 20 MIN: CPT | Mod: PBBFAC,PN | Performed by: ORTHOPAEDIC SURGERY

## 2022-10-26 PROCEDURE — 73130 XR HAND COMPLETE 3 VIEWS BILATERAL: ICD-10-PCS | Mod: 26,50,, | Performed by: RADIOLOGY

## 2022-10-26 PROCEDURE — 99203 PR OFFICE/OUTPT VISIT, NEW, LEVL III, 30-44 MIN: ICD-10-PCS | Mod: S$PBB,,, | Performed by: ORTHOPAEDIC SURGERY

## 2022-10-26 PROCEDURE — 73130 X-RAY EXAM OF HAND: CPT | Mod: TC,50,PO

## 2022-10-26 PROCEDURE — 99999 PR PBB SHADOW E&M-EST. PATIENT-LVL III: ICD-10-PCS | Mod: PBBFAC,,, | Performed by: ORTHOPAEDIC SURGERY

## 2022-10-26 PROCEDURE — 73130 X-RAY EXAM OF HAND: CPT | Mod: 26,50,, | Performed by: RADIOLOGY

## 2022-10-26 PROCEDURE — 99203 OFFICE O/P NEW LOW 30 MIN: CPT | Mod: S$PBB,,, | Performed by: ORTHOPAEDIC SURGERY

## 2022-10-26 PROCEDURE — 99999 PR PBB SHADOW E&M-EST. PATIENT-LVL III: CPT | Mod: PBBFAC,,, | Performed by: ORTHOPAEDIC SURGERY

## 2022-10-26 NOTE — PROGRESS NOTES
10/26/2022    Chief Complaint:  Chief Complaint   Patient presents with    Right Hand - Pain       HPI:  Naila Huerta is a 77 y.o. female, who presents to clinic today she has a history of bilateral hand pain.  She states that the worst pain is over the right index finger MCP joint and the left thumb base.  She states that this has been going on for several months.  She did not have an injury prior to its onset.  She has had some enlargement of those joints and she feels it may be arthritis.  She is here today for evaluation and treatment    PMHX:  Past Medical History:   Diagnosis Date    Hypertension     PONV (postoperative nausea and vomiting)     Swelling     Symptomatic menopausal or female climacteric states        PSHX:  Past Surgical History:   Procedure Laterality Date    ARTHROSCOPY OF KNEE Left 9/6/2019    Procedure: ARTHROSCOPY, KNEE;  Surgeon: Van Monreal MD;  Location: [facility];  Service: Orthopedics;  Laterality: Left;    ARTHROSCOPY OF KNEE Left 1/8/2020    Procedure: ARTHROSCOPY, KNEE;  Surgeon: Van Monreal MD;  Location: [facility] OR;  Service: Orthopedics;  Laterality: Left;    AUGMENTATION OF BREAST  1977    1st set - replaced    AUGMENTATION OF BREAST  2007    2nd set    BREAST SURGERY      COLONOSCOPY  03/19/2014    Dr. Poe    COSMETIC SURGERY      breast aug    ESOPHAGOGASTRODUODENOSCOPY      in [place] had a piece of meat lodged in lower part esophahgus had it removed.    EXCISION OF MEDIAL MENISCUS OF KNEE Left 9/6/2019    Procedure: MENISCECTOMY, KNEE, MEDIAL;  Surgeon: Van Monreal MD;  Location: [facility] OR;  Service: Orthopedics;  Laterality: Left;  Medial and Lateral    EXCISION OF MEDIAL MENISCUS OF KNEE Left 1/8/2020    Procedure: MENISCECTOMY, KNEE, MEDIAL AND LATERAL;  Surgeon: Van Monreal MD;  Location: [facility];  Service: Orthopedics;  Laterality: Left;    EYE SURGERY      bilat phaco with iol    HYSTERECTOMY      Partial        FMHX:  Family History   Problem  Relation Age of Onset    Diabetes Mother     Heart disease Mother         CHF x 6-7 years prior to her demise age 86    Heart disease Father 50        CABG twice, 1st one in his 50s    Pancreatic cancer Father     Hypertension Brother     Diabetes Brother     Pancreatic cancer Brother     Diabetes Brother     Bone cancer Brother     Heart attack Brother     Heart disease Brother     Liver cancer Brother        SOCHX:  Social History     Tobacco Use    Smoking status: Never    Smokeless tobacco: Never   Substance Use Topics    Alcohol use: Yes     Alcohol/week: 1.0 - 2.0 standard drink     Types: 1 - 2 Glasses of wine per week     Comment: 3 - 4 times per week       ALLERGIES:  Amlodipine; Bystolic [nebivolol]; Lexapro [escitalopram oxalate]; Celexa [citalopram]; Latex, natural rubber; Losartan; Demerol [meperidine]; Hydrocodone-acetaminophen; Hydromorphone; and Lisinopril    CURRENT MEDICATIONS:  Current Outpatient Medications on File Prior to Visit   Medication Sig Dispense Refill    ascorbic acid, vitamin C, (VITAMIN C) 1000 MG tablet Take 1,000 mg by mouth once daily.      BIOTIN ORAL Take 1 capsule by mouth once daily.      buPROPion (WELLBUTRIN XL) 150 MG TB24 tablet Take 1 tablet (150 mg total) by mouth once daily. 30 tablet 11    cyanocobalamin, vitamin B-12, (VITAMIN B-12) 2,500 mcg Subl Place 1 tablet under the tongue once daily.      estradioL (ESTRACE) 2 MG tablet Take 2 mg by mouth Daily. Every day      hydroCHLOROthiazide (HYDRODIURIL) 25 MG tablet TAKE 1 TABLET BY MOUTH DAILY IN THE MORNING. 30 tablet 11    nut.tx.impaired digestive fxn (VITAL HIGH PROTEIN ORAL) Take 1 Container by mouth.      olmesartan (BENICAR) 20 MG tablet Take 1 tablet (20 mg total) by mouth once daily. 30 tablet 11    omeprazole (PRILOSEC) 20 MG capsule Take 1 capsule (20 mg total) by mouth once daily. 30 capsule 11    spironolactone (ALDACTONE) 25 MG tablet Take 1 tablet (25 mg total) by mouth once daily. 30 tablet 11    vitamin  D 1000 units Tab Take 1,000 Units by mouth once daily.      celecoxib (CELEBREX) 200 MG capsule Take 200 mg by mouth as needed.      prenatal vit calc,iron,folic (PRENATAL VITAMIN ORAL) Take by mouth.       No current facility-administered medications on file prior to visit.       REVIEW OF SYSTEMS:  Review of Systems   Constitutional: Negative.    HENT:  Positive for hearing loss.    Eyes: Negative.    Respiratory: Negative.     Cardiovascular: Negative.    Gastrointestinal:  Positive for heartburn.   Genitourinary: Negative.    Musculoskeletal:  Positive for joint pain.   Skin: Negative.    Neurological: Negative.    Endo/Heme/Allergies:  Bruises/bleeds easily.   Psychiatric/Behavioral: Negative.       GENERAL PHYSICAL EXAM:   There were no vitals taken for this visit.   GEN: well developed, well nourished, no acute distress   HENT: Normocephalic, atraumatic   EYES: No discharge, conjunctiva normal   NECK: Supple, non-tender   PULM: No wheezing, no respiratory distress   CV: RRR   ABD: Soft, non-tender    ORTHO EXAM:   Examination of bilateral hands reveals that there is no major skin change.  She does have mild swelling or enlargement over the right index finger MCP joint and over the thumb CMC joint on the left.  Palpation about those joints does produce tenderness.  She does report intact sensation in the median radial ulnar distribution.  Capillary refill is less than 2 seconds in all the digits    RADIOLOGY:   X-rays of bilateral hands were taken in clinic today.  The films have been reviewed by me.  She is noted have mild to moderate degenerative changes about the thumb CMC joint on the left as well as the index finger MCP joint on the right.  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 I have discussed treatment with the patient.  She is unable to take oral anti-inflammatories due to reflux disease.  I have also discussed  continuing Voltaren which she states has helped     2. I have discussed the possibility of steroid injection but she does not want to do that today.  She may consider it if this does not improve in the near future    3.  Follow-up with me on a p.r.n. basis

## 2022-11-16 ENCOUNTER — TELEPHONE (OUTPATIENT)
Dept: HEMATOLOGY/ONCOLOGY | Facility: CLINIC | Age: 77
End: 2022-11-16
Payer: MEDICARE

## 2022-11-16 DIAGNOSIS — R23.3 EASY BRUISING: Primary | Chronic | ICD-10-CM

## 2022-11-16 PROBLEM — I48.11 LONGSTANDING PERSISTENT ATRIAL FIBRILLATION: Status: RESOLVED | Noted: 2022-04-11 | Resolved: 2022-11-16

## 2022-11-16 NOTE — TELEPHONE ENCOUNTER
Called and spoke to pt about hem referral from Dr Tran for dx of bruising.  Scheduled with Dr Crews for Dec 5th.  No recent labs done.  Pt was made aware of a  call back if labs will be necessary before appt.   Confirmed appt date time and location.

## 2022-11-17 NOTE — TELEPHONE ENCOUNTER
Called and informed pt of labs to be done before her appt on Dec 5th w/ Dr Crews.  Pt's preference is the [facility], informed her to let them know to do Dr Heard labs only.  Pt verbalized understanding and said she will have them done the week before.   Per Dr Crews orders entered for CBC, INR and PTT.

## 2022-12-04 NOTE — PROGRESS NOTES
PATIENT: Naila Huerta  MRN: [id number]  DATE: 12/5/2022      Diagnosis:   1. Bruising    2. Hypertension, unspecified type        Chief Complaint: Establish Care (Bruising)    Subjective:    Initial History: Ms. Huerta is a 77 y.o. female with HTN who presents for abnormal bruising.  The patient states she started taking Spironolactone nearly a year ago.  For the past 7  months she states she has had increased bruising in her arms.  She denies any bleeding episodes.  The patient denies any melena, BRBPR, hemoptysis, hematemesis, hematuria..  She denies needing blood transfusions when she was younger and was having menstrual cycles.  She states her dermatologist told her she had purpura.  She denies FHx of bleeding disorders.  The patient denies CP, cough, SOB, abdominal pain, N/V, constipation, diarrhea.  The patient denies fever, chills, night sweats, weight loss, new lumps or bumps.  She endorses chronic swelling in her legs, arthritis in her knees and lower back pain on occasion.    Past Medical History:   Past Medical History:   Diagnosis Date    Hypertension     PONV (postoperative nausea and vomiting)     Swelling     Symptomatic menopausal or female climacteric states        Past Surgical HIstory:   Past Surgical History:   Procedure Laterality Date    ARTHROSCOPY OF KNEE Left 9/6/2019    Procedure: ARTHROSCOPY, KNEE;  Surgeon: Van Monreal MD;  Location: [facility];  Service: Orthopedics;  Laterality: Left;    ARTHROSCOPY OF KNEE Left 1/8/2020    Procedure: ARTHROSCOPY, KNEE;  Surgeon: Van Monreal MD;  Location: [facility];  Service: Orthopedics;  Laterality: Left;    AUGMENTATION OF BREAST  1977    1st set - replaced    AUGMENTATION OF BREAST  2007    2nd set    BREAST SURGERY      COLONOSCOPY  03/19/2014    Dr. Poe    COSMETIC SURGERY      breast aug    ESOPHAGOGASTRODUODENOSCOPY      in [place] had a piece of meat lodged in lower part esophahgus had it removed.    EXCISION OF MEDIAL  MENISCUS OF KNEE Left 9/6/2019    Procedure: MENISCECTOMY, KNEE, MEDIAL;  Surgeon: Van Monreal MD;  Location: [facility] OR;  Service: Orthopedics;  Laterality: Left;  Medial and Lateral    EXCISION OF MEDIAL MENISCUS OF KNEE Left 1/8/2020    Procedure: MENISCECTOMY, KNEE, MEDIAL AND LATERAL;  Surgeon: Van Monreal MD;  Location: [facility] OR;  Service: Orthopedics;  Laterality: Left;    EYE SURGERY      bilat phaco with iol    HYSTERECTOMY      Partial        Family History:   Family History   Problem Relation Age of Onset    Diabetes Mother     Heart disease Mother         CHF x 6-7 years prior to her demise age 86    Heart disease Father 50        CABG twice, 1st one in his 50s    Pancreatic cancer Father     Hypertension Brother     Diabetes Brother     Pancreatic cancer Brother     Diabetes Brother     Bone cancer Brother     Heart attack Brother     Heart disease Brother     Liver cancer Brother        Social History:  reports that she has never smoked. She has never used smokeless tobacco. She reports current alcohol use of about 6.0 standard drinks per week. She reports that she does not use drugs.    Allergies:  Review of patient's allergies indicates:   Allergen Reactions    Amlodipine Edema     Ankle swelling     Bystolic [nebivolol] Other (See Comments)     Gastric reflux     Lexapro [escitalopram oxalate] Other (See Comments)     Weak and tired      Celexa [citalopram] Other (See Comments)     Weak and tired      Latex, natural rubber     Losartan      Myalgia, hair loss; liver enzyme elevation, severe diarrhea.     Demerol [meperidine] Nausea And Vomiting    Hydrocodone-acetaminophen Other (See Comments)     Other reaction(s): Vomiting  Other reaction(s): Nausea  ABLE TO TAKE PERCOCET OR TYLENOL WITH CODEINE\, felt flushed and syncopal    Hydromorphone Nausea And Vomiting    Lisinopril Other (See Comments)       Medications:  Current Outpatient Medications   Medication Sig Dispense Refill    ascorbic acid,  vitamin C, (VITAMIN C) 1000 MG tablet Take 1,000 mg by mouth once daily.      BIOTIN ORAL Take 1 capsule by mouth once daily.      buPROPion (WELLBUTRIN XL) 150 MG TB24 tablet Take 1 tablet (150 mg total) by mouth once daily. 30 tablet 11    cyanocobalamin, vitamin B-12, (VITAMIN B-12) 2,500 mcg Subl Place 1 tablet under the tongue once daily.      estradioL (ESTRACE) 2 MG tablet Take 2 mg by mouth Daily. Every day      hydroCHLOROthiazide (HYDRODIURIL) 25 MG tablet TAKE 1 TABLET BY MOUTH DAILY IN THE MORNING. (Patient taking differently: 12.5 mg. Patient taking 12.5 daily) 30 tablet 11    nut.tx.impaired digestive fxn (VITAL HIGH PROTEIN ORAL) Take 1 Container by mouth.      olmesartan (BENICAR) 20 MG tablet Take 1 tablet (20 mg total) by mouth once daily. 30 tablet 11    omeprazole (PRILOSEC) 20 MG capsule Take 1 capsule (20 mg total) by mouth once daily. 30 capsule 11    prenatal vit calc,iron,folic (PRENATAL VITAMIN ORAL) Take by mouth.      spironolactone (ALDACTONE) 25 MG tablet Take 1 tablet (25 mg total) by mouth once daily. 30 tablet 11    vitamin D 1000 units Tab Take 1,000 Units by mouth once daily.       No current facility-administered medications for this visit.       Review of Systems   Constitutional:  Negative for appetite change, chills, fatigue, fever and unexpected weight change.   HENT:  Negative for sore throat and trouble swallowing.    Eyes:  Negative for photophobia, pain and visual disturbance.   Respiratory:  Negative for cough, chest tightness and shortness of breath.    Cardiovascular:  Positive for leg swelling. Negative for chest pain and palpitations.   Gastrointestinal:  Negative for abdominal pain, constipation, diarrhea, nausea and vomiting.   Genitourinary:  Negative for difficulty urinating and dysuria.   Musculoskeletal:  Positive for arthralgias (knee pain) and back pain (lower).   Skin:  Negative for color change and rash.   Neurological:  Negative for dizziness, weakness,  "light-headedness, numbness and headaches.   Hematological:  Negative for adenopathy. Bruises/bleeds easily.     ECOG Performance Status: 0   Objective:      Vitals:   Vitals:    12/05/22 1312   BP: 128/80   BP Location: Right arm   Patient Position: Sitting   BP Method: Medium (Manual)   Pulse: 81   Temp: 97.8 °F (36.6 °C)   TempSrc: Temporal   SpO2: 97%   Weight: 67.9 kg (149 lb 11.1 oz)   Height: 5' 7" (1.702 m)     Physical Exam  Constitutional:       General: She is not in acute distress.     Appearance: She is well-developed. She is not diaphoretic.   HENT:      Head: Normocephalic and atraumatic.   Eyes:      General: No scleral icterus.        Right eye: No discharge.         Left eye: No discharge.   Cardiovascular:      Rate and Rhythm: Normal rate and regular rhythm.      Heart sounds: Normal heart sounds. No murmur heard.    No friction rub. No gallop.   Pulmonary:      Effort: Pulmonary effort is normal. No respiratory distress.      Breath sounds: Normal breath sounds. No wheezing or rales.   Chest:      Chest wall: No tenderness.   Abdominal:      General: Bowel sounds are normal. There is no distension.      Palpations: Abdomen is soft. There is no mass.      Tenderness: There is no abdominal tenderness. There is no guarding or rebound.   Musculoskeletal:         General: No tenderness. Normal range of motion.   Lymphadenopathy:      Cervical: No cervical adenopathy.      Upper Body:      Right upper body: No supraclavicular or axillary adenopathy.      Left upper body: No supraclavicular or axillary adenopathy.   Skin:     Findings: Bruising (small bruises on arm) present. No erythema or rash.   Neurological:      Mental Status: She is alert and oriented to person, place, and time.   Psychiatric:         Behavior: Behavior normal.       Laboratory Data:  No visits with results within 1 Week(s) from this visit.   Latest known visit with results is:   Lab Visit on 11/28/2022   Component Date Value Ref " Range Status    aPTT 11/28/2022 24.5 (L)  24.6 - 36.7 sec Final    PTT normal range is not established for pediatrics.    WBC 11/28/2022 5.82  3.90 - 12.70 K/uL Final    RBC 11/28/2022 3.90 (L)  4.00 - 5.40 M/uL Final    Hemoglobin 11/28/2022 12.3  12.0 - 16.0 g/dL Final    Hematocrit 11/28/2022 36.4 (L)  37.0 - 48.5 % Final    MCV 11/28/2022 93  82 - 98 fL Final    MCH 11/28/2022 31.5 (H)  27.0 - 31.0 pg Final    MCHC 11/28/2022 33.8  32.0 - 36.0 g/dL Final    RDW 11/28/2022 13.4  11.5 - 14.5 % Final    Platelets 11/28/2022 279  150 - 450 K/uL Final    MPV 11/28/2022 9.7  9.2 - 12.9 fL Final    Immature Granulocytes 11/28/2022 0.5  0.0 - 0.5 % Final    Gran # (ANC) 11/28/2022 3.8  1.8 - 7.7 K/uL Final    Immature Grans (Abs) 11/28/2022 0.03  0.00 - 0.04 K/uL Final    Comment: Mild elevation in immature granulocytes is non specific and   can be seen in a variety of conditions including stress response,   acute inflammation, trauma and pregnancy. Correlation with other   laboratory and clinical findings is essential.      Lymph # 11/28/2022 1.0  1.0 - 4.8 K/uL Final    Mono # 11/28/2022 0.6  0.3 - 1.0 K/uL Final    Eos # 11/28/2022 0.4  0.0 - 0.5 K/uL Final    Baso # 11/28/2022 0.04  0.00 - 0.20 K/uL Final    nRBC 11/28/2022 0  0 /100 WBC Final    Gran % 11/28/2022 66.0  38.0 - 73.0 % Final    Lymph % 11/28/2022 16.5 (L)  18.0 - 48.0 % Final    Mono % 11/28/2022 10.1  4.0 - 15.0 % Final    Eosinophil % 11/28/2022 6.2  0.0 - 8.0 % Final    Basophil % 11/28/2022 0.7  0.0 - 1.9 % Final    Differential Method 11/28/2022 Automated   Final    PT 11/28/2022 13.7  11.8 - 14.7 sec Final    PT normal range is not established for pediatrics.    INR 11/28/2022 1.1   Final         Imaging: Reviewed   Assessment:       1. Bruising    2. Hypertension, unspecified type           Plan:     Bruising - The patient has bruises which appear to be senile purpura  -Pt is not on any blood thinners which could exacerbate her symptoms  -Pt  medicine reviewed  -Pt started on Spironolactone which can cause bruising  -PT/PTT not elevated when checked today  -Platelet count is normal  -Will check a von willebrand profile and have the patient return to clinic in 2 week    HTN - pt on Spironolactoen, HCTZ, and olmesartan  -BP controlled  -Will monitor    Route Chart for Scheduling    Med Onc Chart Routing      Follow up with physician 2 weeks. Pt needs a von Willebrand profile done today with return appt in 2 weeks.   Follow up with MERLY    Infusion scheduling note    Injection scheduling note    Labs    Imaging    Pharmacy appointment    Other referrals             Priyank Crews MD  [facility]  Hematology and Oncology  [facility]   [address] [place] [address]   O: [phone]  F: [phone]

## 2022-12-05 ENCOUNTER — LAB VISIT (OUTPATIENT)
Dept: LAB | Facility: HOSPITAL | Age: 77
End: 2022-12-05
Attending: INTERNAL MEDICINE
Payer: MEDICARE

## 2022-12-05 ENCOUNTER — OFFICE VISIT (OUTPATIENT)
Dept: HEMATOLOGY/ONCOLOGY | Facility: CLINIC | Age: 77
End: 2022-12-05
Payer: MEDICARE

## 2022-12-05 VITALS
WEIGHT: 149.69 LBS | TEMPERATURE: 98 F | HEIGHT: 67 IN | BODY MASS INDEX: 23.49 KG/M2 | DIASTOLIC BLOOD PRESSURE: 80 MMHG | SYSTOLIC BLOOD PRESSURE: 128 MMHG | HEART RATE: 81 BPM | OXYGEN SATURATION: 97 %

## 2022-12-05 DIAGNOSIS — T14.8XXA BRUISING: Primary | ICD-10-CM

## 2022-12-05 DIAGNOSIS — T14.8XXA BRUISING: ICD-10-CM

## 2022-12-05 DIAGNOSIS — I10 HYPERTENSION, UNSPECIFIED TYPE: ICD-10-CM

## 2022-12-05 PROCEDURE — 36415 COLL VENOUS BLD VENIPUNCTURE: CPT | Mod: PN | Performed by: INTERNAL MEDICINE

## 2022-12-05 PROCEDURE — 99214 OFFICE O/P EST MOD 30 MIN: CPT | Mod: PBBFAC,PN | Performed by: INTERNAL MEDICINE

## 2022-12-05 PROCEDURE — 99204 OFFICE O/P NEW MOD 45 MIN: CPT | Mod: S$PBB,ICN,CMP, | Performed by: INTERNAL MEDICINE

## 2022-12-05 PROCEDURE — 99999 PR PBB SHADOW E&M-EST. PATIENT-LVL IV: CPT | Mod: PBBFAC,,, | Performed by: INTERNAL MEDICINE

## 2022-12-05 PROCEDURE — 99204 PR OFFICE/OUTPT VISIT, NEW, LEVL IV, 45-59 MIN: ICD-10-PCS | Mod: S$PBB,ICN,CMP, | Performed by: INTERNAL MEDICINE

## 2022-12-05 PROCEDURE — 99999 PR PBB SHADOW E&M-EST. PATIENT-LVL IV: ICD-10-PCS | Mod: PBBFAC,,, | Performed by: INTERNAL MEDICINE

## 2022-12-07 LAB
FACT VIII ACT/NOR PPP: 111 % (ref 55–200)
VON WILLEBRAND EVAL PPP-IMP: NORMAL
VWF AG ACT/NOR PPP IA: 166 % (ref 55–200)
VWF:AC ACT/NOR PPP IA: 124 % (ref 55–200)

## 2022-12-19 ENCOUNTER — OFFICE VISIT (OUTPATIENT)
Dept: HEMATOLOGY/ONCOLOGY | Facility: CLINIC | Age: 77
End: 2022-12-19
Payer: MEDICARE

## 2022-12-19 VITALS
SYSTOLIC BLOOD PRESSURE: 121 MMHG | DIASTOLIC BLOOD PRESSURE: 68 MMHG | WEIGHT: 149.5 LBS | OXYGEN SATURATION: 96 % | BODY MASS INDEX: 23.41 KG/M2 | RESPIRATION RATE: 18 BRPM | HEART RATE: 65 BPM | TEMPERATURE: 97 F

## 2022-12-19 DIAGNOSIS — D69.2 SENILE PURPURA: Primary | ICD-10-CM

## 2022-12-19 DIAGNOSIS — I10 HYPERTENSION, UNSPECIFIED TYPE: ICD-10-CM

## 2022-12-19 PROCEDURE — 99212 OFFICE O/P EST SF 10 MIN: CPT | Mod: S$PBB,,, | Performed by: INTERNAL MEDICINE

## 2022-12-19 PROCEDURE — 99212 PR OFFICE/OUTPT VISIT, EST, LEVL II, 10-19 MIN: ICD-10-PCS | Mod: S$PBB,,, | Performed by: INTERNAL MEDICINE

## 2022-12-19 PROCEDURE — 99999 PR PBB SHADOW E&M-EST. PATIENT-LVL III: ICD-10-PCS | Mod: PBBFAC,,, | Performed by: INTERNAL MEDICINE

## 2022-12-19 PROCEDURE — 99999 PR PBB SHADOW E&M-EST. PATIENT-LVL III: CPT | Mod: PBBFAC,,, | Performed by: INTERNAL MEDICINE

## 2022-12-19 PROCEDURE — 99213 OFFICE O/P EST LOW 20 MIN: CPT | Mod: PBBFAC,PN | Performed by: INTERNAL MEDICINE

## 2022-12-19 NOTE — PROGRESS NOTES
PATIENT: Naila Huerta  MRN: [id number]  DATE: 12/19/2022      Diagnosis:   1. Senile purpura    2. Hypertension, unspecified type          Chief Complaint: Follow-up (Senile Purpura)      Subjective:    Interval History:  Ms. Huerta is a 77 y.o. female with HTN who presents for abnormal bruising.  Since the last clinic visit the patient states she has not had any increase in bruising.  The patient denies CP, cough, SOB, abdominal pain, N/V, constipation, diarrhea.  The patient denies fever, chills, night sweats, weight loss, new lumps or bumps.    Prior History: The patient stated she started taking Spironolactone nearly a year ago.  For the past 7 months she stated she has had increased bruising in her arms.  She denied any bleeding episodes.    Past Medical History:   Past Medical History:   Diagnosis Date    Hypertension     PONV (postoperative nausea and vomiting)     Swelling     Symptomatic menopausal or female climacteric states        Past Surgical HIstory:   Past Surgical History:   Procedure Laterality Date    ARTHROSCOPY OF KNEE Left 9/6/2019    Procedure: ARTHROSCOPY, KNEE;  Surgeon: Van Monreal MD;  Location: [facility];  Service: Orthopedics;  Laterality: Left;    ARTHROSCOPY OF KNEE Left 1/8/2020    Procedure: ARTHROSCOPY, KNEE;  Surgeon: Van Monreal MD;  Location: [facility];  Service: Orthopedics;  Laterality: Left;    AUGMENTATION OF BREAST  1977    1st set - replaced    AUGMENTATION OF BREAST  2007    2nd set    BREAST SURGERY      COLONOSCOPY  03/19/2014    Dr. Poe    COSMETIC SURGERY      breast aug    ESOPHAGOGASTRODUODENOSCOPY      in [place] had a piece of meat lodged in lower part esophahgus had it removed.    EXCISION OF MEDIAL MENISCUS OF KNEE Left 9/6/2019    Procedure: MENISCECTOMY, KNEE, MEDIAL;  Surgeon: aVn Monreal MD;  Location: [facility];  Service: Orthopedics;  Laterality: Left;  Medial and Lateral    EXCISION OF MEDIAL MENISCUS OF KNEE Left 1/8/2020    Procedure:  MENISCECTOMY, KNEE, MEDIAL AND LATERAL;  Surgeon: Van Monreal MD;  Location: [facility];  Service: Orthopedics;  Laterality: Left;    EYE SURGERY      bilat phaco with iol    HYSTERECTOMY      Partial        Family History:   Family History   Problem Relation Age of Onset    Diabetes Mother     Heart disease Mother         CHF x 6-7 years prior to her demise age 86    Heart disease Father 50        CABG twice, 1st one in his 50s    Pancreatic cancer Father     Hypertension Brother     Diabetes Brother     Pancreatic cancer Brother     Diabetes Brother     Bone cancer Brother     Heart attack Brother     Heart disease Brother     Liver cancer Brother        Social History:  reports that she has never smoked. She has never used smokeless tobacco. She reports current alcohol use of about 6.0 standard drinks per week. She reports that she does not use drugs.    Allergies:  Review of patient's allergies indicates:   Allergen Reactions    Amlodipine Edema     Ankle swelling     Bystolic [nebivolol] Other (See Comments)     Gastric reflux     Lexapro [escitalopram oxalate] Other (See Comments)     Weak and tired      Celexa [citalopram] Other (See Comments)     Weak and tired      Latex, natural rubber     Losartan      Myalgia, hair loss; liver enzyme elevation, severe diarrhea.     Demerol [meperidine] Nausea And Vomiting    Hydrocodone-acetaminophen Other (See Comments)     Other reaction(s): Vomiting  Other reaction(s): Nausea  ABLE TO TAKE PERCOCET OR TYLENOL WITH CODEINE\, felt flushed and syncopal    Hydromorphone Nausea And Vomiting    Lisinopril Other (See Comments)       Medications:  Current Outpatient Medications   Medication Sig Dispense Refill    ascorbic acid, vitamin C, (VITAMIN C) 1000 MG tablet Take 1,000 mg by mouth once daily.      BIOTIN ORAL Take 1 capsule by mouth once daily.      buPROPion (WELLBUTRIN XL) 150 MG TB24 tablet Take 1 tablet (150 mg total) by mouth once daily. 30 tablet 11     cyanocobalamin, vitamin B-12, (VITAMIN B-12) 2,500 mcg Subl Place 1 tablet under the tongue once daily.      estradioL (ESTRACE) 2 MG tablet Take 2 mg by mouth Daily. Every day      hydroCHLOROthiazide (HYDRODIURIL) 25 MG tablet TAKE 1 TABLET BY MOUTH DAILY IN THE MORNING. (Patient taking differently: 12.5 mg. Patient taking 12.5 daily) 30 tablet 11    nut.tx.impaired digestive fxn (VITAL HIGH PROTEIN ORAL) Take 1 Container by mouth.      olmesartan (BENICAR) 20 MG tablet Take 1 tablet (20 mg total) by mouth once daily. 30 tablet 11    omeprazole (PRILOSEC) 20 MG capsule Take 1 capsule (20 mg total) by mouth once daily. 30 capsule 11    prenatal vit calc,iron,folic (PRENATAL VITAMIN ORAL) Take by mouth.      spironolactone (ALDACTONE) 25 MG tablet Take 1 tablet (25 mg total) by mouth once daily. 30 tablet 11    vitamin D 1000 units Tab Take 1,000 Units by mouth once daily.       No current facility-administered medications for this visit.       Review of Systems   Constitutional:  Negative for chills, fatigue, fever and unexpected weight change.   Respiratory:  Negative for cough and shortness of breath.    Cardiovascular:  Negative for chest pain and palpitations.   Gastrointestinal:  Negative for abdominal pain, constipation, diarrhea, nausea and vomiting.   Skin:  Negative for rash.   Neurological:  Negative for headaches.   Hematological:  Negative for adenopathy. Bruises/bleeds easily.     ECOG Performance Status: 0   Objective:      Vitals:   Vitals:    12/19/22 1531   BP: 121/68   BP Location: Left arm   Patient Position: Sitting   BP Method: Medium (Automatic)   Pulse: 65   Resp: 18   Temp: 96.9 °F (36.1 °C)   TempSrc: Temporal   SpO2: 96%   Weight: 67.8 kg (149 lb 7.6 oz)     Physical Exam  Constitutional:       General: She is not in acute distress.     Appearance: She is well-developed. She is not diaphoretic.   HENT:      Head: Normocephalic and atraumatic.   Cardiovascular:      Rate and Rhythm: Normal  rate and regular rhythm.      Heart sounds: Normal heart sounds. No murmur heard.    No friction rub. No gallop.   Pulmonary:      Effort: Pulmonary effort is normal. No respiratory distress.      Breath sounds: Normal breath sounds. No wheezing or rales.   Chest:      Chest wall: No tenderness.   Abdominal:      General: Bowel sounds are normal. There is no distension.      Palpations: Abdomen is soft. There is no mass.      Tenderness: There is no abdominal tenderness. There is no guarding or rebound.   Lymphadenopathy:      Cervical: No cervical adenopathy.      Upper Body:      Right upper body: No supraclavicular or axillary adenopathy.      Left upper body: No supraclavicular or axillary adenopathy.   Skin:     Findings: Bruising (several bruises on right forearm) present. No erythema or rash.   Neurological:      Mental Status: She is alert and oriented to person, place, and time.   Psychiatric:         Behavior: Behavior normal.       Laboratory Data:  No visits with results within 1 Week(s) from this visit.   Latest known visit with results is:   Lab Visit on 12/05/2022   Component Date Value Ref Range Status    Factor VIII Activity 12/05/2022 111  55 - 200 % Final    Comment: -------------------ADDITIONAL INFORMATION-------------------  This test has been modified from the 's   instructions. Its performance characteristics were   determined by [facility] in a manner consistent with   CLIA requirements. This test has not been cleared or   approved by the U.S. Food and Drug Administration.      Von Willebrand Ag 12/05/2022 166  55 - 200 % Final    Comment: -------------------ADDITIONAL INFORMATION-------------------  This test has been modified from the 's   instructions. Its performance characteristics were   determined by [facility] in a manner consistent with   CLIA requirements. This test has not been cleared or   approved by the U.S. Food and Drug Administration.      Von  Willebrand Factor Activity 12/05/2022 124  55 - 200 % Final    Comment: -------------------ADDITIONAL INFORMATION-------------------  This test has been modified from the 's   instructions. Its performance characteristics were   determined by [facility] in a manner consistent with   CLIA requirements. This test has not been cleared or   approved by the U.S. Food and Drug Administration.    Test Performed by:  [place] [address]  : Gustabo Schultz M.D. Ph.D.; CLIA# [id number]      von Willebrand Tech Interpretation 12/05/2022 SEE BELOW   Final    Comment: IMPRESSION: No laboratory evidence of von Willebrand  disease (VWD).  COMMENTS: Normal or elevated factor VIII coagulant activity  and/or von Willebrand factor (VWF) antigen and/or VWF  activity [latex immunoassay] provide no evidence for   von Willebrand disease (VWD).    NOTE: VWF antigen and/or VWF latex immunoassay activity  and/or factor VIII may be increased above baseline levels  by acute or chronic inflammation, stress or adrenergic  stimuli, pregnancy or estrogen and oral contraceptive (OCP)  therapy, liver disease or recent infusion of plasma,   cryoprecipitate, desmopressin (DDAVP) or VWF concentrates  and mask the diagnosis of mild von Willebrand disease  (VWD).  Note: Apparently normal individuals of blood group &quot;O&quot; may  have somewhat lower factor VIII and von Willebrand factor   (VWF) than individuals of other blood groups, such that   (for example) those of group &quot;O&quot; may have VWF antigen as   low as 40-50%, whereas normals of nongroup &quot;O&quot; ge                           nerally   have VWF antigen above 60-70%. Suggest clinical   correlation.            Imaging: Reviewed   Assessment:       1. Senile purpura    2. Hypertension, unspecified type             Plan:     Senile Purpura - The patient has bruises which appear to be  senile purpura  -Pt is not on any blood thinners which could exacerbate her symptoms  -Pt medicine previously reviewed  -Pt started on Spironolactone which can cause bruising  -PT/PTT not elevated on 11/28/22 with normal platelet count  -von Willebrand profile 12/05/.22 showed no abnormalities  -Pt instructed she has senile purpura which developed with aging and predisposes to bruising  -No medical management needed    HTN - pt on Spironolactone, HCTZ, and olmesartan  -BP controlled  -PCP managing    Route Chart for Scheduling    Med Onc Chart Routing      Follow up with physician No follow up needed.   Follow up with MERLY    Infusion scheduling note    Injection scheduling note    Labs    Imaging    Pharmacy appointment    Other referrals             Priyank Crews MD  [facility]  Hematology and Oncology  [facility] [address] [place] [address]   O: [phone]  F: [phone]

## 2023-04-04 PROBLEM — D69.2 SENILE PURPURA: Status: ACTIVE | Noted: 2023-04-04

## 2023-08-02 PROBLEM — M25.561 CHRONIC PAIN OF RIGHT KNEE: Chronic | Status: ACTIVE | Noted: 2020-06-09

## 2023-08-02 PROBLEM — H61.23 BILATERAL IMPACTED CERUMEN: Status: RESOLVED | Noted: 2020-06-12 | Resolved: 2023-08-02

## 2023-08-02 PROBLEM — G89.29 CHRONIC PAIN OF RIGHT KNEE: Chronic | Status: ACTIVE | Noted: 2020-06-09

## 2023-08-02 PROBLEM — F41.9 ANXIETY: Chronic | Status: ACTIVE | Noted: 2022-07-14

## 2023-08-02 PROBLEM — D69.2 SENILE PURPURA: Chronic | Status: ACTIVE | Noted: 2023-04-04

## 2024-03-06 PROBLEM — I77.1 STRICTURE OF ARTERY: Status: RESOLVED | Noted: 2022-01-03 | Resolved: 2024-03-06

## 2024-06-03 ENCOUNTER — OFFICE VISIT (OUTPATIENT)
Dept: ORTHOPEDICS | Facility: CLINIC | Age: 79
End: 2024-06-03
Payer: MEDICARE

## 2024-06-03 ENCOUNTER — HOSPITAL ENCOUNTER (OUTPATIENT)
Dept: RADIOLOGY | Facility: HOSPITAL | Age: 79
Discharge: HOME | End: 2024-06-03
Attending: ORTHOPAEDIC SURGERY
Payer: MEDICARE

## 2024-06-03 DIAGNOSIS — M79.642 BILATERAL HAND PAIN: Primary | ICD-10-CM

## 2024-06-03 DIAGNOSIS — M79.641 BILATERAL HAND PAIN: ICD-10-CM

## 2024-06-03 DIAGNOSIS — M19.041 DEGENERATIVE ARTHRITIS OF METACARPOPHALANGEAL JOINT OF INDEX FINGER OF RIGHT HAND: ICD-10-CM

## 2024-06-03 DIAGNOSIS — M79.642 BILATERAL HAND PAIN: ICD-10-CM

## 2024-06-03 DIAGNOSIS — M18.11 ARTHRITIS OF CARPOMETACARPAL (CMC) JOINT OF RIGHT THUMB: ICD-10-CM

## 2024-06-03 DIAGNOSIS — M79.641 BILATERAL HAND PAIN: Primary | ICD-10-CM

## 2024-06-03 DIAGNOSIS — M18.12 ARTHRITIS OF CARPOMETACARPAL (CMC) JOINT OF LEFT THUMB: ICD-10-CM

## 2024-06-03 PROCEDURE — 73130 X-RAY EXAM OF HAND: CPT | Mod: TC,50,PO

## 2024-06-03 PROCEDURE — 99999PBSHW PR PBB SHADOW TECHNICAL ONLY FILED TO HB: Mod: PBBFAC,,,

## 2024-06-03 PROCEDURE — 20600 DRAIN/INJ JOINT/BURSA W/O US: CPT | Mod: PBBFAC,PO,LT | Performed by: ORTHOPAEDIC SURGERY

## 2024-06-03 PROCEDURE — 99999 PR PBB SHADOW E&M-EST. PATIENT-LVL III: CPT | Mod: PBBFAC,,, | Performed by: ORTHOPAEDIC SURGERY

## 2024-06-03 PROCEDURE — 99213 OFFICE O/P EST LOW 20 MIN: CPT | Mod: PBBFAC,25,PO | Performed by: ORTHOPAEDIC SURGERY

## 2024-06-03 PROCEDURE — 99213 OFFICE O/P EST LOW 20 MIN: CPT | Mod: S$PBB,25,, | Performed by: ORTHOPAEDIC SURGERY

## 2024-06-03 PROCEDURE — 73130 X-RAY EXAM OF HAND: CPT | Mod: 26,50,, | Performed by: RADIOLOGY

## 2024-06-03 RX ORDER — TRIAMCINOLONE ACETONIDE 40 MG/ML
40 INJECTION, SUSPENSION INTRA-ARTICULAR; INTRAMUSCULAR
Status: DISCONTINUED | OUTPATIENT
Start: 2024-06-03 | End: 2024-06-03 | Stop reason: HOSPADM

## 2024-06-03 RX ADMIN — TRIAMCINOLONE ACETONIDE 40 MG: 40 INJECTION, SUSPENSION INTRA-ARTICULAR; INTRAMUSCULAR at 09:06

## 2024-06-03 NOTE — PROCEDURES
Small Joint Aspiration/Injection: L thumb CMC    Date/Time: 6/3/2024 9:40 AM    Performed by: Reginaldo Michel MD  Authorized by: Reginaldo Michel MD    Consent Done?:  Yes (Verbal)  Indications:  Pain  Site marked: the procedure site was marked    Timeout: prior to procedure the correct patient, procedure, and site was verified    Local anesthetic:  Topical anesthetic  Location:  Thumb  Site:  L thumb CMC (Left thumb CMC joint)  Ultrasonic guidance for needle placement?: No    Needle size:  25 G  Medications:  40 mg triamcinolone acetonide 40 mg/mL; 40 mg triamcinolone acetonide 40 mg/mL (20 mg injected)  Patient tolerance:  Patient tolerated the procedure well with no immediate complications

## 2024-06-03 NOTE — PROGRESS NOTES
Ms Huerta  returns to clinic today.  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  She states that those 2 joints are continuing to cause pain.  She also had a fall approximately 6 months ago in which she had a laceration to her hand and has had some persistent pain over her wrist.  She would like to have all of these things evaluated.      Physical exam: Examination of the right hand and wrist reveals that there is no edema.  She has well-healed laceration.  She does have some areas of ecchymosis.  Palpation does not produce significant tenderness over the wrist.  She does have enlargement of the index finger MCP joint with tenderness at that site.  She does report intact sensation and capillary refill is less than 2 seconds     Examination of the left hand and wrist reveals that there is no edema.  There are no major skin changes.  She does have tenderness over the CMC joint.  She has a positive grind test.  She does not have tenderness over the remainder of the hand.  Has mild tenderness over the 1st extensor compartment as well.  She does have sensation intact.  Capillary refill is less than 2 seconds     Radiology: X-rays of bilateral hands were taken in clinic today there is moderate arthritis about the right index finger MCP joint and the left thumb CMC joint.  There are mild degenerative changes in other joints.  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  We will proceed with a injection to the left thumb CMC joint.  2.  Will continue to follow the arthritis of the right index finger.  At this time she would like to try some Voltaren.  She was not a candidate for oral anti-inflammatories     3.  She will follow up with me if she continues to have pain or dysfunction at which point we will discuss any further treatment needs

## 2025-01-06 PROBLEM — I20.9 ANGINA PECTORIS: Status: ACTIVE | Noted: 2025-01-06

## 2025-01-08 NOTE — DISCHARGE INSTRUCTIONS
Knee Athroplasty Discharge Instructions    Pain:     After surgery your knee will be sore. The knee will likely have been injected with a numbing medicine (Exparel) prior to completion of surgery for pain control. This is indicated on a green bracelet that you will continue to wear for 4 days after surgery. Ice and elevation will assist with pain control.    Apply ice as much as possible for the first 72 hours. After 72 hours, apply ice for 20minutes after therapy or whenever experiencing pain. Avoid direct skin contact with ice to prevent frostbit.           Elevate the affected leg with the pillow the length of the leg higher than your heart to assist with swelling and pain.  2.  Incision Care:    Some drainage from the incision in the first 72 hours is normal. If drainage is excessive, reinforce dressing and call your surgeon.  Call [phone]. Do not go to the ER. Keep incision clean, dry and covered until staples removed. Staples will be removed 14-21 days after surgery.    3.  Activity:                  -Perform exercises 2 x day.  -You may shower 48 hours after surgery  providing the dressing is waterproof. You can wrap the knee or hip with press n seal saran wrap to assist with keeping the dressing dry while showering.   No tub or hot tub usage for 4 weeks after surgery. Support help is mandatory during showering. If the  dressing becomes wet, replace with a new dressing. Do not leave a wet dressing on.   -Wear casimiro hose to both extremities  for 3 weeks after surgery .You may remove stockings for 1- 2 hours during the day only.            -If your physician orders the CPM machine you are to use it for 2 hours in the am and 2 hours in the pm.Start the machine at -5 extension and 50 degree flexion. Increase flexion 5 degrees each SESSION. This is not to replace your exercise program.    5. Possible Complication:  Infections: Report these signs and symptoms to your surgeon:  Unexpected redness around  incision  Persistent drainage from wound after 72 hours  Temperature Greater than 101 degrees F  Additional swelling  Pain not controlled with current pain medication  Blood Clot: Report these signs and symptoms to your surgeon:  Unusual pain, unusual warm skin  Red or discolored skin  Swelling in the leg     You may need antibiotic coverage before dental or minor surgical procedures.    If you are discharged on Aspirin 81 mg twice a day, please administer for 1 month. If you are sent home on Eliquis follow your physician's orders.                Your Surgeon Gave You EXPAREL® (bupivacaine liposome injectable suspension)    To help control your pain after surgery, your surgeon injected EXPAREL into your surgical incision just before the end of the procedure.   EXPAREL is a local analgesic that contains the local anesthetic bupivacaine. Local anesthetics provide pain relief by numbing the tissue  around the surgical site.   EXPAREL is specifically designed to release pain medication over time and can control pain for up to 72 hours.   In addition to EXPAREL, your surgeon may provide other pain medications to control your pain.   Each patient is different and responds differently to painmedication. Depending on how you respond to EXPAREL, you may require less  additional pain medication during your recovery.    Possible Side Effects    Side effects can occur with any medication and it is important not to ignore anything you may be experiencing. Some patients who  received EXPAREL experienced nausea, vomiting, or constipation. Rarely, patients who receive bupivacaine (the active ingredient in  EXPAREL) have experienced numbness and tingling in their mouth or lips, lightheadedness, or anxiety. Speak with your doctor right  away if you think you may be experiencing any of these sensations, or if you have other questions regarding possible side effects.    Your Recovery    When your pain is under control, your body can  better focus on healing. This is not the time to test your pain tolerance, or grin and  bear it.Work with your surgeon and nurse to make your recovery as speedy and pain-free as possible.   Follow the post-op orders your nurse gave you.   Eat a healthy diet and drink plenty of water. Surgery stresses your body; your body responds by needing more energy to heal.      Important Information About EXPAREL  Products that contain bupivacaine, like EXPAREL, may cause a temporary loss of  sensation or the ability to move in the area where bupivacaine was injected.    Date Administered: 1/27/25  Time Administered: 0918    Other Forms of Bupivicaine should not be administered within 96hrs following administration of EXPAREL.

## 2025-01-17 ENCOUNTER — HOSPITAL ENCOUNTER (OUTPATIENT)
Dept: PREADMISSION TESTING | Facility: OTHER | Age: 80
Discharge: HOME | End: 2025-01-17
Attending: ORTHOPAEDIC SURGERY
Payer: MEDICARE

## 2025-01-17 ENCOUNTER — ANESTHESIA EVENT (OUTPATIENT)
Dept: SURGERY | Facility: OTHER | Age: 80
End: 2025-01-17
Payer: MEDICARE

## 2025-01-17 VITALS
SYSTOLIC BLOOD PRESSURE: 132 MMHG | DIASTOLIC BLOOD PRESSURE: 69 MMHG | HEIGHT: 68 IN | BODY MASS INDEX: 21.22 KG/M2 | WEIGHT: 140 LBS | TEMPERATURE: 98 F | HEART RATE: 76 BPM | RESPIRATION RATE: 16 BRPM | OXYGEN SATURATION: 98 %

## 2025-01-17 DIAGNOSIS — M17.12 UNILATERAL PRIMARY OSTEOARTHRITIS, LEFT KNEE: Primary | ICD-10-CM

## 2025-01-17 RX ORDER — ACETAMINOPHEN 500 MG
1000 TABLET ORAL
Status: CANCELLED | OUTPATIENT
Start: 2025-01-17 | End: 2025-01-17

## 2025-01-17 RX ORDER — SODIUM CHLORIDE, SODIUM LACTATE, POTASSIUM CHLORIDE, CALCIUM CHLORIDE 600; 310; 30; 20 MG/100ML; MG/100ML; MG/100ML; MG/100ML
INJECTION, SOLUTION INTRAVENOUS CONTINUOUS
Status: CANCELLED | OUTPATIENT
Start: 2025-01-17

## 2025-01-17 RX ORDER — LIDOCAINE HYDROCHLORIDE 10 MG/ML
0.5 INJECTION, SOLUTION EPIDURAL; INFILTRATION; INTRACAUDAL; PERINEURAL ONCE
Status: CANCELLED | OUTPATIENT
Start: 2025-01-17 | End: 2025-01-17

## 2025-01-17 NOTE — DISCHARGE INSTRUCTIONS
Information to Prepare you for your Surgery    PRE-ADMIT TESTING   [address]  [place] [facility]  [address]     Your surgery has been scheduled at [facility]. We are pleased to have the opportunity to serve you. For Further Information please call [phone].    On the day of surgery please report to Registration on the 1st floor of the [facility].    CONTACT YOUR PHYSICIAN'S OFFICE THE DAY PRIOR TO YOUR SURGERY TO OBTAIN YOUR ARRIVAL TIME.     The evening before surgery do not eat anything after 9 p.m. ( this includes hard candy, chewing gum and mints).  You may only have GATORADE, POWERADE AND WATER  from 9 p.m. until you leave your home.   DO NOT DRINK ANY LIQUIDS ON THE WAY TO THE HOSPITAL.      Why does your anesthesiologist allow you to drink Gatorade/Powerade before surgery?  Gatorade/Powerade helps to increase your comfort before surgery and to decrease your nausea after surgery.   The carbohydrates in Gatorade/Powerade help reduce your body's stress response to surgery.  If you are a diabetic-drink only water prior to surgery.    Outpatient Surgery- May allow 2 adults (18 and older)/ Support Persons (1 being the designated ) for all surgical/procedural patients. A breastfeeding mother will be allowed her infant and 2 adult Support Persons. No one under the age of 18 will be allowed in the building.    MEDICATION INSTRUCTIONS: TAKE medications checked off by the Anesthesiologist on your Medication List.    Surgery Patients:  If you take ASPIRIN - Your PHYSICIAN/SURGEON will need to inform you IF/OR when you need to stop taking aspirin prior to your surgery.     Starting the week prior to surgery, do not take any medications containing IBUPROFEN or NSAIDS (Advil, Aleve, BC, Celebrex, Goody's, Ketorolac, Meloxicam, Mobic, Motrin, Naproxen, Toradol, etc).  If you are not sure if you should take a medicine please call your surgeon's office.  You may take Tylenol.    Do  Not Wear any make-up (especially eye make-up) to surgery. Please remove any false eyelashes or eyelash extensions. If you arrive the day of surgery with makeup/eyelashes on you will be required to remove prior to surgery. (There is a risk of corneal abrasions if eye makeup/eyelash extensions are not removed)    Leave all valuables at home.   Do Not wear any jewelry or watches, including any metal in body piercings. Jewelry must be removed prior to coming to the hospital.  There is a possibility that rings that are unable to be removed may be cut off if they are on the surgical extremity.    Please remove all hair extensions, wigs, clips and any other metal accessories/ ornaments from your hair.  These items may pose a flammable/fire risk in Surgery and must be removed.    Do not shave your surgical area at least 5 days prior to your surgery. The surgical prep will be performed at the hospital according to Infection Control regulations.    Contact Lens must be removed before surgery. Either do not wear the contact lens or bring a case and solution for storage.  Please bring a container for eyeglasses or dentures as required.  Bring any paperwork your physician has provided, such as consent forms,  history and physicals, doctor's orders, etc.   Bring comfortable clothes that are loose fitting to wear upon discharge. Take into consideration the type of surgery being performed.  Maintain your diet as advised per your physician the day prior to surgery.    Adequate rest the night before surgery is advised.   Park in the Parking lot behind the hospital or in the [facility] across the street from the parking lot. Parking is complimentary.  If you will be discharged the same day as your procedure, please arrange for a responsible adult to drive you home or to accompany you if traveling by taxi.   YOU WILL NOT BE PERMITTED TO DRIVE OR TO LEAVE THE HOSPITAL ALONE AFTER SURGERY.   If you are being discharged the  same day, it is strongly recommended that you arrange for someone to remain with you for the first 24 hrs following your surgery.    The Surgeon will speak to your family/visitor after your surgery regarding the outcome of your surgery and post op care.  The Surgeon may speak to you after your surgery, but there is a possibility you may not remember the details.  Please check with your family members regarding the conversation with the Surgeon.    We strongly recommend whoever is bringing you home be present for discharge instructions.  This will ensure a thorough understanding for your post op home care.              Bathing Instructions with Hibiclens  Shower the evening before and morning of your procedure with Chlorhexidine (Hibiclens)    Do not use Chlorhexidine on your face or genitals. Do not get in your eyes.  Wash your face with water and your regular face wash/soap  Use your regular shampoo  Apply Chlorhexidine (Hibiclens) directly on your skin or on a wet washcloth and wash gently. When showering: Move away from the shower stream when applying Chlorhexidine (Hibiclens) to avoid rinsing off too soon.  Rinse thoroughly with warm water  Do not dilute Chlorhexidine (Hibiclens)   Dry off as usual, do not use any deodorant, powder, body lotions, perfume, after shave or cologne.     If the patient has fever, cough, or signs/symptoms of Flu or Covid please do not come in for your surgery.   Contact your surgeon and your primary care physician for further instructions.   Please also call [facility] Outpatient Surgery [phone]. The unit opens at 5 AM.    If applicable, please bring your blood pressure & diabetes medications the day of surgery.

## 2025-01-17 NOTE — PRE ADMISSION SCREENING
"Dr. Liriano reviewed outside EKG resulted on 12/17/24. "Ok" No new orders received.   Dr. Liriano reviewed clearance by Dr. Jeancarlos Monreal for surgery with Dr. Van Monreal on 1/27/25. No new orders received.   "

## 2025-01-17 NOTE — ANESTHESIA PREPROCEDURE EVALUATION
01/17/2025  Naila Huerta is a 79 y.o., female.      Pre-op Assessment    I have reviewed the Patient Summary Reports.     I have reviewed the Nursing Notes. I have reviewed the NPO Status.   I have reviewed the Medications.     Review of Systems  Anesthesia Hx:             Denies Family Hx of Anesthesia complications.   Personal Hx of Anesthesia complications, Post-Operative Nausea/Vomiting                    Social:  Non-Smoker       Hematology/Oncology:    Oncology Normal                Hematology Comments: Senile purpura                    Cardiovascular:     Hypertension      Angina         Echo 2021:  Summary    · The left ventricle is normal in size with normal systolic function.  · Grade I left ventricular diastolic dysfunction.  · The estimated PA systolic pressure is 37 mmHg.  · Normal right ventricular size with normal right ventricular systolic function.  · Normal central venous pressure (3 mmHg).  · The estimated ejection fraction is 65%.  · Mild mitral regurgitation.  · Mild tricuspid regurgitation.                             Pulmonary:  Pulmonary Normal                       Renal/:  Renal/ Normal                 Hepatic/GI:     GERD                Musculoskeletal:         Spine Disorders: (scoliosis)             Neurological:  Neurology Normal                                      Endocrine:  Endocrine Normal            Psych:   anxiety                 Physical Exam  General: Well nourished, Cooperative, Alert and Oriented    Airway:  Mallampati: II   Mouth Opening: Normal  TM Distance: Normal  Tongue: Normal  Neck ROM: Normal ROM    Dental:  Intact, Caps / Implants        Anesthesia Plan  Type of Anesthesia, risks & benefits discussed:    Anesthesia Type: Spinal  Intra-op Monitoring Plan: Standard ASA Monitors  Post Op Pain Control Plan: multimodal analgesia  Induction:   IV  Informed Consent: Informed consent signed with the Patient and all parties understand the risks and agree with anesthesia plan.  All questions answered.   ASA Score: 2  Anesthesia Plan Notes: Pre-op eval, labs in [facility]    Ready For Surgery From Anesthesia Perspective.     .

## 2025-01-24 NOTE — PLAN OF CARE
Case Management Assessment     PCP: SUKH Monreal Jr., MD  Pharmacy: bedside    Patient Arrived From: Home  Existing Help at Home: Spouse     Barriers to Discharge: None    Discharge Plan:    A. Home with HH    B. Home with HH   I provided the patient a choice of post acute providers and offered a list of CMS rated HH. Patient has declined to select a preferred provider and elects placement with the first accepting in network provider that is available to provide services as ordered by the physician.         [facility] ([place])  Initial Discharge Assessment       Primary Care Provider: SUKH Monreal Jr., MD    Admission Diagnosis: Unilateral primary osteoarthritis, left knee [M17.12]    Admission Date: (Not on file)  Expected Discharge Date:     Transition of Care Barriers: (P) None    Payor: MEDICARE / Plan: MEDICARE PART A & B / Product Type: Government /     Extended Emergency Contact Information  Primary Emergency Contact: Jovani Huerta   [place]  Mobile Phone: [phone]  Relation: Spouse  Secondary Emergency Contact: Jacklyn Vaz   [place]  Mobile Phone: [phone]  Relation: Daughter    Discharge Plan A: (P) Home Health  Discharge Plan B: (P) Home Health      [id number] [place] [address] [facility] [address]  [address] [address] [address]  Phone: [phone] Fax: [phone]    [facility] [address] [facility]  [address] [facility] [address]  Phone: [phone] Fax: [phone]      Initial Assessment (most recent)       Adult Discharge Assessment - 01/24/25 1428          Discharge Assessment    Assessment Type Discharge Planning Assessment (P)      Confirmed/corrected address, phone number and insurance Yes (P)      Confirmed Demographics Correct on Facesheet (P)      Source of Information patient;health record (P)      People in Home spouse (P)      Do you expect to  return to your current living situation? Yes (P)      Do you have help at home or someone to help you manage your care at home? Yes (P)      Who are your caregiver(s) and their phone number(s)? Jovani Huerta (Spouse)  [phone] (P)      Prior to hospitilization cognitive status: Alert/Oriented;No Deficits (P)      Current cognitive status: Alert/Oriented;No Deficits (P)      Equipment Currently Used at Home none (P)      Readmission within 30 days? No (P)      Patient currently being followed by outpatient case management? No (P)      Do you currently have service(s) that help you manage your care at home? No (P)      Do you take prescription medications? Yes (P)      Do you have prescription coverage? Yes (P)      Do you have any problems affording any of your prescribed medications? No (P)      Is the patient taking medications as prescribed? yes (P)      How do you get to doctors appointments? family or friend will provide;car, drives self (P)      Are you on dialysis? No (P)      Do you take coumadin? No (P)      Discharge Plan A Home Health (P)      Discharge Plan B Home Health (P)      DME Needed Upon Discharge  walker, rolling;bedside commode (P)      Discharge Plan discussed with: Patient (P)      Transition of Care Barriers None (P)

## 2025-01-27 ENCOUNTER — ANESTHESIA (OUTPATIENT)
Dept: SURGERY | Facility: OTHER | Age: 80
End: 2025-01-27
Payer: MEDICARE

## 2025-01-27 ENCOUNTER — HOSPITAL ENCOUNTER (OUTPATIENT)
Facility: OTHER | Age: 80
Discharge: HOME HEALTH | End: 2025-01-27
Attending: ORTHOPAEDIC SURGERY | Admitting: ORTHOPAEDIC SURGERY
Payer: MEDICARE

## 2025-01-27 VITALS
RESPIRATION RATE: 17 BRPM | HEIGHT: 68 IN | WEIGHT: 140 LBS | TEMPERATURE: 98 F | SYSTOLIC BLOOD PRESSURE: 131 MMHG | BODY MASS INDEX: 21.22 KG/M2 | DIASTOLIC BLOOD PRESSURE: 69 MMHG | HEART RATE: 72 BPM | OXYGEN SATURATION: 99 %

## 2025-01-27 DIAGNOSIS — M17.12 UNILATERAL PRIMARY OSTEOARTHRITIS, LEFT KNEE: ICD-10-CM

## 2025-01-27 PROCEDURE — 63600175 PHARM REV CODE 636 W HCPCS: Performed by: ANESTHESIOLOGY

## 2025-01-27 PROCEDURE — 63600175 PHARM REV CODE 636 W HCPCS

## 2025-01-27 PROCEDURE — 25000003 PHARM REV CODE 250: Performed by: ORTHOPAEDIC SURGERY

## 2025-01-27 PROCEDURE — C1713 ANCHOR/SCREW BN/BN,TIS/BN: HCPCS | Performed by: ORTHOPAEDIC SURGERY

## 2025-01-27 PROCEDURE — 63600175 PHARM REV CODE 636 W HCPCS: Performed by: STUDENT IN AN ORGANIZED HEALTH CARE EDUCATION/TRAINING PROGRAM

## 2025-01-27 PROCEDURE — 25000003 PHARM REV CODE 250: Performed by: ANESTHESIOLOGY

## 2025-01-27 PROCEDURE — 71000016 HC POSTOP RECOV ADDL HR: Performed by: ORTHOPAEDIC SURGERY

## 2025-01-27 PROCEDURE — 36000710: Performed by: ORTHOPAEDIC SURGERY

## 2025-01-27 PROCEDURE — 71000039 HC RECOVERY, EACH ADD'L HOUR: Performed by: ORTHOPAEDIC SURGERY

## 2025-01-27 PROCEDURE — 37000008 HC ANESTHESIA 1ST 15 MINUTES: Performed by: ORTHOPAEDIC SURGERY

## 2025-01-27 PROCEDURE — 37000009 HC ANESTHESIA EA ADD 15 MINS: Performed by: ORTHOPAEDIC SURGERY

## 2025-01-27 PROCEDURE — 63600175 PHARM REV CODE 636 W HCPCS: Performed by: ORTHOPAEDIC SURGERY

## 2025-01-27 PROCEDURE — 97116 GAIT TRAINING THERAPY: CPT

## 2025-01-27 PROCEDURE — 36000711: Performed by: ORTHOPAEDIC SURGERY

## 2025-01-27 PROCEDURE — C1776 JOINT DEVICE (IMPLANTABLE): HCPCS | Performed by: ORTHOPAEDIC SURGERY

## 2025-01-27 PROCEDURE — D9220A PRA ANESTHESIA: Mod: CRNA,,, | Performed by: NURSE ANESTHETIST, CERTIFIED REGISTERED

## 2025-01-27 PROCEDURE — 25000003 PHARM REV CODE 250

## 2025-01-27 PROCEDURE — 71000033 HC RECOVERY, INTIAL HOUR: Performed by: ORTHOPAEDIC SURGERY

## 2025-01-27 PROCEDURE — 71000015 HC POSTOP RECOV 1ST HR: Performed by: ORTHOPAEDIC SURGERY

## 2025-01-27 PROCEDURE — 97162 PT EVAL MOD COMPLEX 30 MIN: CPT

## 2025-01-27 PROCEDURE — 63600175 PHARM REV CODE 636 W HCPCS: Performed by: NURSE ANESTHETIST, CERTIFIED REGISTERED

## 2025-01-27 PROCEDURE — D9220A PRA ANESTHESIA: Mod: ANES,,, | Performed by: ANESTHESIOLOGY

## 2025-01-27 PROCEDURE — 27201423 OPTIME MED/SURG SUP & DEVICES STERILE SUPPLY: Performed by: ORTHOPAEDIC SURGERY

## 2025-01-27 DEVICE — CEMENT REFOBACIN BCR 1X40: Type: IMPLANTABLE DEVICE | Site: KNEE | Status: FUNCTIONAL

## 2025-01-27 DEVICE — IMPLANTABLE DEVICE: Type: IMPLANTABLE DEVICE | Site: KNEE | Status: FUNCTIONAL

## 2025-01-27 DEVICE — PATELLA NEXGEN ALL-POLY: Type: IMPLANTABLE DEVICE | Site: KNEE | Status: FUNCTIONAL

## 2025-01-27 DEVICE — COMPONENT PERSONA CR SZ7 LT: Type: IMPLANTABLE DEVICE | Site: KNEE | Status: FUNCTIONAL

## 2025-01-27 DEVICE — PSN TIB STM 5 DEG SZ D L: Type: IMPLANTABLE DEVICE | Site: KNEE | Status: FUNCTIONAL

## 2025-01-27 RX ORDER — LIDOCAINE HYDROCHLORIDE 20 MG/ML
INJECTION INTRAVENOUS
Status: DISCONTINUED | OUTPATIENT
Start: 2025-01-27 | End: 2025-01-27

## 2025-01-27 RX ORDER — BUPIVACAINE 13.3 MG/ML
INJECTION, SUSPENSION, LIPOSOMAL INFILTRATION
Status: DISCONTINUED | OUTPATIENT
Start: 2025-01-27 | End: 2025-01-27 | Stop reason: HOSPADM

## 2025-01-27 RX ORDER — HYDROMORPHONE HYDROCHLORIDE 2 MG/ML
0.4 INJECTION, SOLUTION INTRAMUSCULAR; INTRAVENOUS; SUBCUTANEOUS EVERY 5 MIN PRN
Status: DISCONTINUED | OUTPATIENT
Start: 2025-01-27 | End: 2025-01-27 | Stop reason: HOSPADM

## 2025-01-27 RX ORDER — SODIUM CHLORIDE 0.9 % (FLUSH) 0.9 %
3 SYRINGE (ML) INJECTION EVERY 6 HOURS PRN
Status: DISCONTINUED | OUTPATIENT
Start: 2025-01-27 | End: 2025-01-27 | Stop reason: HOSPADM

## 2025-01-27 RX ORDER — OXYCODONE AND ACETAMINOPHEN 5; 325 MG/1; MG/1
TABLET ORAL
Qty: 60 TABLET | Refills: 0 | Status: SHIPPED | OUTPATIENT
Start: 2025-01-27

## 2025-01-27 RX ORDER — ACETAMINOPHEN 325 MG/1
650 TABLET ORAL EVERY 4 HOURS PRN
Status: DISCONTINUED | OUTPATIENT
Start: 2025-01-27 | End: 2025-01-27 | Stop reason: HOSPADM

## 2025-01-27 RX ORDER — TRANEXAMIC ACID 100 MG/ML
INJECTION, SOLUTION INTRAVENOUS
Status: DISCONTINUED | OUTPATIENT
Start: 2025-01-27 | End: 2025-01-27

## 2025-01-27 RX ORDER — LIDOCAINE HYDROCHLORIDE 10 MG/ML
0.5 INJECTION, SOLUTION EPIDURAL; INFILTRATION; INTRACAUDAL; PERINEURAL ONCE
Status: DISCONTINUED | OUTPATIENT
Start: 2025-01-27 | End: 2025-01-27 | Stop reason: HOSPADM

## 2025-01-27 RX ORDER — ROPIVACAINE HYDROCHLORIDE 5 MG/ML
INJECTION, SOLUTION EPIDURAL; INFILTRATION; PERINEURAL
Status: COMPLETED | OUTPATIENT
Start: 2025-01-27 | End: 2025-01-27

## 2025-01-27 RX ORDER — ONDANSETRON 8 MG/1
8 TABLET, ORALLY DISINTEGRATING ORAL EVERY 8 HOURS PRN
Status: DISCONTINUED | OUTPATIENT
Start: 2025-01-27 | End: 2025-01-27 | Stop reason: HOSPADM

## 2025-01-27 RX ORDER — OXYCODONE HYDROCHLORIDE 5 MG/1
5 TABLET ORAL EVERY 4 HOURS PRN
Status: DISCONTINUED | OUTPATIENT
Start: 2025-01-27 | End: 2025-01-27 | Stop reason: HOSPADM

## 2025-01-27 RX ORDER — FAMOTIDINE 40 MG/1
40 TABLET, FILM COATED ORAL DAILY
COMMUNITY

## 2025-01-27 RX ORDER — ACETAMINOPHEN 500 MG
1000 TABLET ORAL
Status: COMPLETED | OUTPATIENT
Start: 2025-01-27 | End: 2025-01-27

## 2025-01-27 RX ORDER — PROCHLORPERAZINE EDISYLATE 5 MG/ML
5 INJECTION INTRAMUSCULAR; INTRAVENOUS EVERY 30 MIN PRN
Status: DISCONTINUED | OUTPATIENT
Start: 2025-01-27 | End: 2025-01-27 | Stop reason: HOSPADM

## 2025-01-27 RX ORDER — CEFAZOLIN 2 G/1
2 INJECTION, POWDER, FOR SOLUTION INTRAMUSCULAR; INTRAVENOUS
Status: COMPLETED | OUTPATIENT
Start: 2025-01-27 | End: 2025-01-27

## 2025-01-27 RX ORDER — OXYCODONE HYDROCHLORIDE 5 MG/1
10 TABLET ORAL EVERY 4 HOURS PRN
Status: DISCONTINUED | OUTPATIENT
Start: 2025-01-27 | End: 2025-01-27 | Stop reason: HOSPADM

## 2025-01-27 RX ORDER — KETOROLAC TROMETHAMINE 30 MG/ML
INJECTION, SOLUTION INTRAMUSCULAR; INTRAVENOUS
Status: DISCONTINUED | OUTPATIENT
Start: 2025-01-27 | End: 2025-01-27 | Stop reason: HOSPADM

## 2025-01-27 RX ORDER — OXYCODONE HYDROCHLORIDE 5 MG/1
5 TABLET ORAL
Status: DISCONTINUED | OUTPATIENT
Start: 2025-01-27 | End: 2025-01-27 | Stop reason: HOSPADM

## 2025-01-27 RX ORDER — SODIUM CHLORIDE, SODIUM LACTATE, POTASSIUM CHLORIDE, CALCIUM CHLORIDE 600; 310; 30; 20 MG/100ML; MG/100ML; MG/100ML; MG/100ML
INJECTION, SOLUTION INTRAVENOUS CONTINUOUS
Status: DISCONTINUED | OUTPATIENT
Start: 2025-01-27 | End: 2025-01-27 | Stop reason: HOSPADM

## 2025-01-27 RX ORDER — MEPERIDINE HYDROCHLORIDE 25 MG/ML
12.5 INJECTION INTRAMUSCULAR; INTRAVENOUS; SUBCUTANEOUS ONCE AS NEEDED
Status: DISCONTINUED | OUTPATIENT
Start: 2025-01-27 | End: 2025-01-27 | Stop reason: HOSPADM

## 2025-01-27 RX ORDER — GLUCAGON 1 MG
1 KIT INJECTION
Status: DISCONTINUED | OUTPATIENT
Start: 2025-01-27 | End: 2025-01-27 | Stop reason: HOSPADM

## 2025-01-27 RX ORDER — DEXAMETHASONE SODIUM PHOSPHATE 4 MG/ML
INJECTION, SOLUTION INTRA-ARTICULAR; INTRALESIONAL; INTRAMUSCULAR; INTRAVENOUS; SOFT TISSUE
Status: DISCONTINUED | OUTPATIENT
Start: 2025-01-27 | End: 2025-01-27

## 2025-01-27 RX ORDER — METOCLOPRAMIDE HYDROCHLORIDE 5 MG/ML
5 INJECTION INTRAMUSCULAR; INTRAVENOUS EVERY 6 HOURS PRN
Status: DISCONTINUED | OUTPATIENT
Start: 2025-01-27 | End: 2025-01-27 | Stop reason: HOSPADM

## 2025-01-27 RX ORDER — PHENYLEPHRINE HYDROCHLORIDE 10 MG/ML
INJECTION INTRAVENOUS
Status: DISCONTINUED | OUTPATIENT
Start: 2025-01-27 | End: 2025-01-27

## 2025-01-27 RX ORDER — SODIUM CHLORIDE 0.9 % (FLUSH) 0.9 %
3 SYRINGE (ML) INJECTION
Status: DISCONTINUED | OUTPATIENT
Start: 2025-01-27 | End: 2025-01-27 | Stop reason: HOSPADM

## 2025-01-27 RX ORDER — NAPROXEN SODIUM 220 MG/1
81 TABLET, FILM COATED ORAL 2 TIMES DAILY
Qty: 60 TABLET | Refills: 0 | Status: SHIPPED | OUTPATIENT
Start: 2025-01-27

## 2025-01-27 RX ORDER — FENTANYL CITRATE 50 UG/ML
INJECTION, SOLUTION INTRAMUSCULAR; INTRAVENOUS
Status: DISCONTINUED | OUTPATIENT
Start: 2025-01-27 | End: 2025-01-27

## 2025-01-27 RX ORDER — ONDANSETRON HYDROCHLORIDE 2 MG/ML
INJECTION, SOLUTION INTRAVENOUS
Status: DISCONTINUED | OUTPATIENT
Start: 2025-01-27 | End: 2025-01-27

## 2025-01-27 RX ORDER — PROPOFOL 10 MG/ML
VIAL (ML) INTRAVENOUS CONTINUOUS PRN
Status: DISCONTINUED | OUTPATIENT
Start: 2025-01-27 | End: 2025-01-27

## 2025-01-27 RX ORDER — ONDANSETRON 8 MG/1
8 TABLET, ORALLY DISINTEGRATING ORAL EVERY 8 HOURS PRN
Qty: 20 TABLET | Refills: 1 | Status: SHIPPED | OUTPATIENT
Start: 2025-01-27

## 2025-01-27 RX ORDER — BUPIVACAINE HCL/EPINEPHRINE 0.25-.0005
VIAL (ML) INJECTION
Status: DISCONTINUED | OUTPATIENT
Start: 2025-01-27 | End: 2025-01-27 | Stop reason: HOSPADM

## 2025-01-27 RX ORDER — PROPOFOL 10 MG/ML
VIAL (ML) INTRAVENOUS
Status: DISCONTINUED | OUTPATIENT
Start: 2025-01-27 | End: 2025-01-27

## 2025-01-27 RX ADMIN — ONDANSETRON HYDROCHLORIDE 4 MG: 2 INJECTION INTRAMUSCULAR; INTRAVENOUS at 07:01

## 2025-01-27 RX ADMIN — LIDOCAINE HYDROCHLORIDE 100 MG: 20 INJECTION, SOLUTION INTRAVENOUS at 09:01

## 2025-01-27 RX ADMIN — SODIUM CHLORIDE, SODIUM LACTATE, POTASSIUM CHLORIDE, AND CALCIUM CHLORIDE: .6; .31; .03; .02 INJECTION, SOLUTION INTRAVENOUS at 08:01

## 2025-01-27 RX ADMIN — PHENYLEPHRINE HYDROCHLORIDE 100 MCG: 10 INJECTION INTRAVENOUS at 09:01

## 2025-01-27 RX ADMIN — PROPOFOL 40 MG: 10 INJECTION, EMULSION INTRAVENOUS at 09:01

## 2025-01-27 RX ADMIN — ACETAMINOPHEN 1000 MG: 500 TABLET, FILM COATED ORAL at 06:01

## 2025-01-27 RX ADMIN — SODIUM CHLORIDE, SODIUM LACTATE, POTASSIUM CHLORIDE, AND CALCIUM CHLORIDE: .6; .31; .03; .02 INJECTION, SOLUTION INTRAVENOUS at 09:01

## 2025-01-27 RX ADMIN — SODIUM CHLORIDE, POTASSIUM CHLORIDE, SODIUM LACTATE AND CALCIUM CHLORIDE: 600; 310; 30; 20 INJECTION, SOLUTION INTRAVENOUS at 06:01

## 2025-01-27 RX ADMIN — SODIUM CHLORIDE 1000 MG: 9 INJECTION, SOLUTION INTRAVENOUS at 06:01

## 2025-01-27 RX ADMIN — GLYCOPYRROLATE 0.2 MG: 0.2 INJECTION, SOLUTION INTRAMUSCULAR; INTRAVITREAL at 09:01

## 2025-01-27 RX ADMIN — PROPOFOL 80 MCG/KG/MIN: 10 INJECTION, EMULSION INTRAVENOUS at 09:01

## 2025-01-27 RX ADMIN — TRANEXAMIC ACID 1000 MG: 100 INJECTION, SOLUTION INTRAVENOUS at 07:01

## 2025-01-27 RX ADMIN — FENTANYL CITRATE 100 MCG: 50 INJECTION, SOLUTION INTRAMUSCULAR; INTRAVENOUS at 08:01

## 2025-01-27 RX ADMIN — OXYCODONE HYDROCHLORIDE 5 MG: 5 TABLET ORAL at 11:01

## 2025-01-27 RX ADMIN — CEFAZOLIN 2 G: 2 INJECTION, POWDER, FOR SOLUTION INTRAMUSCULAR; INTRAVENOUS at 02:01

## 2025-01-27 RX ADMIN — CEFAZOLIN 2 G: 2 INJECTION, POWDER, FOR SOLUTION INTRAMUSCULAR; INTRAVENOUS at 09:01

## 2025-01-27 RX ADMIN — OXYCODONE HYDROCHLORIDE 5 MG: 5 TABLET ORAL at 03:01

## 2025-01-27 RX ADMIN — DEXAMETHASONE SODIUM PHOSPHATE 8 MG: 4 INJECTION, SOLUTION INTRAMUSCULAR; INTRAVENOUS at 09:01

## 2025-01-27 RX ADMIN — METOCLOPRAMIDE 5 MG: 5 INJECTION, SOLUTION INTRAMUSCULAR; INTRAVENOUS at 01:01

## 2025-01-27 RX ADMIN — ROPIVACAINE HYDROCHLORIDE 3 ML: 5 INJECTION, SOLUTION EPIDURAL; INFILTRATION; PERINEURAL at 08:01

## 2025-01-27 NOTE — PLAN OF CARE
D/C Recommendation- Low Intensity Therapy  DME Recommendation- RW, BSC, TTB    Eval completed; pt able to perform HEP as well as transfer, gait and stair training with RW SBA without significant c/o L LE pain. Pt scheduled to D/C POD 0 and has met all acute P.T. goals. Please re-consult if pt admits or situation changes.     Problem: Physical Therapy  Goal: Physical Therapy Goal  Description: No P.T. goals identified     Outcome: Met

## 2025-01-27 NOTE — ANESTHESIA POSTPROCEDURE EVALUATION
Anesthesia Post Evaluation    Patient: Naila Huerta    Procedure(s) Performed: Procedure(s) (LRB):  ARTHROPLASTY, KNEE, TOTAL (Left)    Final Anesthesia Type: spinal      Patient location during evaluation: PACU  Patient participation: Yes- Able to Participate  Level of consciousness: awake and alert  Post-procedure vital signs: reviewed and stable  Pain management: adequate  Airway patency: patent    PONV status at discharge: No PONV  Anesthetic complications: no      Cardiovascular status: hemodynamically stable  Respiratory status: unassisted  Hydration status: euvolemic  Follow-up not needed.              Vitals Value Taken Time   /69 01/27/25 1310   Temp 36.8 °C (98.3 °F) 01/27/25 1210   Pulse 72 01/27/25 1310   Resp 17 01/27/25 1310   SpO2 99 % 01/27/25 1310         Event Time   Out of Recovery 12:03:38         Pain/Zac Score: Pain Rating Prior to Med Admin: 0 (1/27/2025 11:55 AM)  Zac Score: 10 (1/27/2025  1:10 PM)

## 2025-01-27 NOTE — PT/OT/SLP EVAL
"Physical Therapy Evaluation and Discharge Note    Patient Name:  Naila Huerta   MRN:  [id number]    Recommendations:     Discharge Recommendations: Low Intensity Therapy  Discharge Equipment Recommendations: walker, rolling, bedside commode, bath bench   Barriers to discharge: None    Assessment:     Naila Huerta is a 79 y.o. female admitted with a medical diagnosis of Unilateral primary osteoarthritis, left knee.     Eval completed; pt able to perform HEP as well as transfer, gait and stair training with RW SBA without significant c/o L LE pain. Pt scheduled to D/C POD 0 and has met all acute P.T. goals.    Recent Surgery: Procedure(s) (LRB):  ARTHROPLASTY, KNEE, TOTAL (Left) Day of Surgery    Plan:     During this hospitalization, patient does not require further acute PT services.  Please re-consult if situation changes.      Subjective     Chief Complaint: Pt reports L foot is still about "15%" numb   Patient/Family Comments/goals: Pt agreeable to PT eval   Pain/Comfort:  Pain Rating 1: 5/10  Location - Side 1: Left  Location 1: knee  Pain Addressed 1: Pre-medicate for activity, Cessation of Activity    Patients cultural, spiritual, Alevism conflicts given the current situation: no    Living Environment:  [facility] with 1 CORTES   Prior to admission, patients level of function was I with mobility, reports she needs the other knee replaced.  Equipment used at home: none.  DME owned (not currently used): none.  Upon discharge, patient will have assistance from lives with  in [place].    Objective:     Communicated with HENRY Moscoso prior to session.  Patient found supine with cryotherapy, peripheral IV upon PT entry to room.    General Precautions: Standard, fall    Orthopedic Precautions:LLE weight bearing as tolerated   Braces: N/A  Respiratory Status: Room air    Exams:  Lingering numbness and decreased light touch sensation L foot  R LE ROM and strength WFL  L hip and ankle ROM " WFL, knee 0-90 deg, good quad contraction    Functional Mobility:  Bed Mobility:     Supine to Sit: stand by assistance  Transfers:     Sit to Stand:  stand by assistance with rolling walker  Bed to Chair: stand by assistance with  rolling walker  using  Step Transfer  Toilet Transfer: stand by assistance with  rolling walker  using  Step Transfer  Gait: 100' x 2 SBA with RW, pt required cueing to progress to step-through gait pattern, good weight acceptance on L LE but hesitant with turns   Stairs:  Pt ascended/descended 4 stair(s) with No Assistive Device with bilateral handrails with Stand-by Assistance.     AM-PAC 6 CLICK MOBILITY  Total Score:24       Treatment and Education:  Pt educated on role of P.T, POC and mobility training. Issued handout on and instructed in surgical precautions and HEP.    Pt performed supine L LE therex, 1 set of 10 each as follows: AP, quad set, heelslide in tolerable range, SLR      AM-PAC 6 CLICK MOBILITY  Total Score:24     Patient left up in chair with all lines intact, call button in reach, RN notified, and  present.    GOALS:   Multidisciplinary Problems       Physical Therapy Goals       Not on file              Multidisciplinary Problems (Resolved)          Problem: Physical Therapy    Goal Priority Disciplines Outcome Interventions   Physical Therapy Goal   (Resolved)     PT, PT/OT Met    Description: No P.T. goals identified                          DME Justifications:   Naila's mobility limitation cannot be sufficiently resolved by the use of a cane. Her functional mobility deficit can be sufficiently resolved with the use of a Rolling Walker. Patient's mobility limitation significantly impairs their ability to participate in one of more activities of daily living.  The use of a RW will significantly improve the patient's ability to participate in MRADLS and the patient will use it on regular basis in the home.    History:     Past Medical History:   Diagnosis  Date    Hypertension     PONV (postoperative nausea and vomiting)     Swelling     Symptomatic menopausal or female climacteric states        Past Surgical History:   Procedure Laterality Date    ARTHROSCOPY OF KNEE Left 09/06/2019    Procedure: ARTHROSCOPY, KNEE;  Surgeon: Van Monreal MD;  Location: [facility];  Service: Orthopedics;  Laterality: Left;    ARTHROSCOPY OF KNEE Left 01/08/2020    Procedure: ARTHROSCOPY, KNEE;  Surgeon: Van Monreal MD;  Location: [facility];  Service: Orthopedics;  Laterality: Left;    AUGMENTATION OF BREAST  1977    1st set - replaced    AUGMENTATION OF BREAST  2007    2nd set    BREAST SURGERY      COLONOSCOPY  03/19/2014    Dr. Poe    COSMETIC SURGERY      breast aug    ESOPHAGOGASTRODUODENOSCOPY      in [place] had a piece of meat lodged in lower part esophahgus had it removed.    EXCISION OF MEDIAL MENISCUS OF KNEE Left 09/06/2019    Procedure: MENISCECTOMY, KNEE, MEDIAL;  Surgeon: Van Monreal MD;  Location: [facility];  Service: Orthopedics;  Laterality: Left;  Medial and Lateral    EXCISION OF MEDIAL MENISCUS OF KNEE Left 01/08/2020    Procedure: MENISCECTOMY, KNEE, MEDIAL AND LATERAL;  Surgeon: Van Mnoreal MD;  Location: [facility];  Service: Orthopedics;  Laterality: Left;    EYE SURGERY      bilat phaco with iol    HYSTERECTOMY      Partial        Time Tracking:     PT Received On: 01/27/25  PT Start Time: 1326     PT Stop Time: 1357  PT Total Time (min): 31 min     Billable Minutes: Evaluation 14 and Gait Training 14      01/27/2025

## 2025-01-27 NOTE — PLAN OF CARE
Case Management Final Discharge Note      Discharge Disposition: Home with HH     New DME ordered / company name: RW, BSC and SC from [facility]    Relevant SDOH / Transition of Care Barriers:  NA    Person available to provide assistance at home when needed and their contact information: Family     Scheduled followup appointment: [place]     Referrals placed: NA    Transportation: Family   [facility] ([facility])  Discharge Final Note    Primary Care Provider: SUKH Monreal Jr., MD    Expected Discharge Date: 1/27/2025    Final Discharge Note (most recent)       Final Note - 01/27/25 1404          Final Note    Assessment Type Final Discharge Note (P)      Anticipated Discharge Disposition Home-Health Care Svc (P)      Hospital Resources/Appts/Education Provided Provided patient/caregiver with written discharge plan information;Appointments scheduled and added to AVS (P)         Post-Acute Status    Post-Acute Authorization Home Health (P)      Home Health Status Set-up Complete/Auth obtained (P)      Discharge Delays None known at this time (P)                      Important Message from Medicare             Contact Info       Van Monreal MD   Specialty: Orthopedic Surgery    [address] [facility]  [facility] [address]   Phone: [phone]       Next Steps: Follow up in 4 week(s)    *[facility]   Specialty: Home Health Services, Home Therapy Services, Home Living Aide Services    [address] [place] [address]   Phone: [phone]       Next Steps: Follow up on 1/29/2025    Instructions: They will contact you for your home healthcare appointments.    [facility]   Specialty: DME Provider, Orthotics    [address] [place] [address]   Phone: [phone]       Next Steps: Follow up    Instructions: Contact if you have questions regarding your medical equipment.

## 2025-01-27 NOTE — OP NOTE
[facility]  Operative Report    SUMMARY     Surgery Date: 1/27/2025     Surgeons and Role:     * Van Monreal MD - Primary    Assistant: EVITA Scott    Pre-op Diagnosis:  Unilateral primary osteoarthritis, left knee [M17.12]    Post-op Diagnosis:  Post-Op Diagnosis Codes:     * Unilateral primary osteoarthritis, left knee [M17.12]    Procedure(s) (LRB):  ARTHROPLASTY, KNEE, TOTAL (Left) ([facility])    Anesthesia: Spinal    Description of Procedure: The appropriate consent was signed. The patient understood and except all risks and complications. The patient was brought to the Operating Room after undergoing spinal anesthetic. Tourniquet was applied to the proximal operative leg. The lower extremity was then prepped and draped in a sterile manner. After exsanguination of Esmarch bandage, tourniquet was inflated to 300 mmHg. An anterior incision with a medial parapatellar arthrotomy was performed. The menisci, anterior cruciate ligament and patellar fat pad were excised. The patella was resurfaced and sized to a 32 mm patella.   Three holes were then drilled for the lugs and this was trialed. A hole was then  drilled in the distal femur, jose e was placed down the femoral canal and the   distal femur cut in 6 degrees of valgus. The tibia was then cut  with the extramedullary device   in 0 degree varus valgus with 5 degrees in posterior   slope. Extension block was placed and full extension was noted. The femur was   then sized to a #7 and #7 cutting block was placed and the anterior and   posterior cuts as well as chamfer cuts were performed. The tibia was sized to a  size D  and a trial was performed.Trials were performed and a 10 mm spacer was felt to be appropriate.The tibia was then prepared with the drill and the punch, and trials were   removed and the knee washed out. Aquamantys was used to paint the posterior   capsule and corners. Palacos cement with gentamicin was then mixed and    pressurized sequentially in tibia, femur and patella. Components were impacted   and excess cement was removed. A trial 10 mm spacer was placed and knee   brought out to full extension. Once the cement was allowed to harden, the 10 mm  spacer was felt to be appropriate and this was locked into the tibial   baseplate. Tourniquet was deflated and hemostasis was obtained. Good patellar   tracking was noted. Exparel cocktail was injected into the fascia and   subcutaneous tissue. The fascial closure was obtained with a running #2 Quill suture. Subcutaneous closure was obtained with #1 and 2-0 Vicryl. Skin was then closed with the staples and a sterile compressive dressing was applied. The patient was then brought to the Recovery Room in a good condition.        Estimated Blood Loss: 50cc         Specimens:   Specimen (24h ago, onward)      None

## 2025-01-27 NOTE — ANESTHESIA PROCEDURE NOTES
Spinal    Diagnosis: DJD left knee  Patient location during procedure: holding area  Timeout: 1/27/2025 8:37 AM    Staffing  Authorizing Provider: Robert West MD  Performing Provider: Robert West MD    Staffing  Performed by: Robert West MD  Authorized by: Robert West MD    Preanesthetic Checklist  Completed: patient identified, IV checked, site marked, risks and benefits discussed, surgical consent, monitors and equipment checked, pre-op evaluation and timeout performed  Spinal Block  Patient position: sitting  Prep: ChloraPrep  Patient monitoring: heart rate, cardiac monitor, continuous pulse ox and frequent blood pressure checks  Approach: left paramedian  Location: L3-4  Injection technique: single shot  CSF Fluid: clear free-flowing CSF  Needle  Needle gauge: 24 G  Needle length: 4 in  Additional Documentation: incremental injection, negative aspiration for heme and no paresthesia on injection  Needle localization: anatomical landmarks  Assessment  Ease of block: easy  Patient's tolerance of the procedure: comfortable throughout block and no complaints  Medications:    Medications: ropivacaine (NAROPIN) injection 0.5% - Intraspinal   3 mL - 1/27/2025 8:43:00 AM

## 2025-01-27 NOTE — PLAN OF CARE
[facility]  [address]  [place] [address]  [phone]               [facility] Orthopedic Discharge Orders    Home [place]         Expected Discharge Date:  1/27/25      ADMIT TO HOME HEALTH DATE:1/29/25    Diagnoses:  Post-op L TKR replacement    Patient is homebound due to:   Pt requires home health services due to taxing effort to leave the home as a result of immobility from Post-op L TKR  replacement    Weight Bearing Status:   full weight bearing: FWB unless otherwise indicated.  Progress to cane as able.  Set up for outpatient PT as soon as able after staple removal once patient is MOD 1 with cane.    Physical Therapy:   3 times a week for 2 weeks (Call for further orders beyond 2 weeks)  - Ambulate with a rolling walker  - Progress to cane  - Instruct on ROM and strengthening of knee    Aide to provide assistance with personal care and  ADLs  2 times a week for 2 weeks    Wound Care: DAILY AND PRN  Surgical dressing change:Starting on  post op day 2 then daily and prn saturation.Change dressing while knee in flexed position utilizing collagen dressing then apply telfa cover dressing. Teach patient to change daily.   Surgical dressing is water resistant. Protect surgical dressing from getting wet by using press and seal saranwrap or garbage bag. Removal and replacement of dressing after shower only needed if incision is suspected  to have gotten wet during shower.  Otherwise change as previously described depending on dressing/drainage. No soaking in the tub or hot tub use for 6 weeks.    PT/SN to remove staples 14 days Post-op and apply skin prep and steri-strips.    Cold therapy/Ice: Encouraged at least 20 minutes 2-3 times daily or more if desired.  Incision must be kept waterproof while icing.  Wear TEDS Bilateral Thigh High Stockings for 3 weeks. Casimiro hose x 3 weeks. Ok to remove casimiro hose 1-2 hours/day max if desired.   If CPM ordered Utilize 2 hours in morning and 2 hours  in evening. , Start at -5 degrees extension and 50 degree flexion. Increase flexion 10 degrees daily. Low post op mobility.       Contact:  Please contact Dr Van Monreal [phone] with concerns.        BLOOD THINNER:    If sent home on Xarelto         -14 days post-op for TKR       -30 days post-op for THR     If sent home home on ASA    81mg   BID x 4 weeks     Once finished with prescribed blood thinner, patients can return to pre-surgical ASA dosage if they took ASA before surgery.       Outpatient Therapy: [facility]    [address] [address]   or  [address] [place] [address]    Call [phone] to schedule appointment  Fax [phone]    If need orders: Call Alina at Ext [phone]        DME:  - rolling Walker  - 3 in 1 commode.   - tub bench / shower chair  - Per PT/OT recommendation          Van Monreal

## 2025-01-31 NOTE — DISCHARGE SUMMARY
[facility]  Short Stay  Discharge Summary  TOTAL KNEE REPLACEMENT    Admit Date: 1/27/2025    Discharge Date and Time: 1/27/2025  4:24 PM      Discharge Attending Physician: Van Monreal MD    Hospital Course:  Naila Huerta,is a 79 y.o. female with severe osteoarthritis L knee, unrelieved with the conservative measures. The patient was admitted on  1/27/2025 and underwent L total knee replacement with computer-assisted navigation. Postoperatively, weightbearing as tolerated with a walker and CPM machine was initiated on postop day #1. Naila Huerta did quite well and was discharged on 1/27/2025  with home health physical therapy and nursing. The patient will be on Percocet for pain and aspirin 325 mg p.o. b.i.d. with meals x4 weeks.  A CPM machine will will be sent home with the patient. Postoperative follow up will be in the office in 4 weeks.       Final Diagnoses:    Principal Problem: Unilateral primary osteoarthritis, left knee   Secondary Diagnoses:   Active Hospital Problems    Diagnosis  POA    *Unilateral primary osteoarthritis, left knee [M17.12]  Yes      Resolved Hospital Problems   No resolved problems to display.       Discharged Condition: good    Disposition: Home-Health Care [facility]    Follow up/Patient Instructions:    Medications:  Reconciled Home Medications:      Medication List        START taking these medications      aspirin 81 MG Chew  Take 1 tablet (81 mg total) by mouth 2 (two) times daily.     ondansetron 8 MG Tbdl  Commonly known as: ZOFRAN-ODT  dissolve 1 tablet (8 mg total) by mouth every 8 (eight) hours as needed (Nausea).     oxyCODONE-acetaminophen 5-325 mg per tablet  Commonly known as: PERCOCET  take 1 tablet every 4 hours as needed  or 2 tablets every 6 hours as needed            CONTINUE taking these medications      ascorbic acid (vitamin C) 1000 MG tablet  Commonly known as: VITAMIN C  Take 1,000 mg by mouth once daily.     buPROPion 150 MG  "TB24 tablet  Commonly known as: WELLBUTRIN XL  Take 1 tablet (150 mg total) by mouth once daily.     cyanocobalamin (vitamin B-12) 2,500 mcg Subl  Commonly known as: VITAMIN B-12  Place 1 tablet under the tongue once daily.     estradioL 2 MG tablet  Commonly known as: ESTRACE  Take 2 mg by mouth Daily. Every day     famotidine 40 MG tablet  Commonly known as: PEPCID  Take 40 mg by mouth once daily.     gabapentin 100 MG capsule  Commonly known as: NEURONTIN  Take 4 capsule by mouth every night at bedtime More than 7 days supply is medically necessary     hydroCHLOROthiazide 25 MG tablet  Commonly known as: HYDRODIURIL  Take 1 tablet (25 mg total) by mouth daily as needed (pedal edema).     multivit with min-folic acid 200 mcg Chew  Take by mouth.     olmesartan 20 MG tablet  Commonly known as: BENICAR  Take 1 tablet (20 mg total) by mouth once daily.     omeprazole 20 MG capsule  Commonly known as: PRILOSEC  Take 1 capsule (20 mg total) by mouth once daily.     vitamin D 1000 units Tab  Commonly known as: VITAMIN D3  Take 1,000 Units by mouth once daily.            STOP taking these medications      meloxicam 15 MG tablet  Commonly known as: MOBIC     PRESERVISION AREDS-2 ORAL     TYLENOL ORAL            Discharge Procedure Orders   WALKER FOR HOME USE     Order Specific Question Answer Comments   Type of Walker: Adult (5'4"-6'6")    With wheels? Yes    Height: 5' 7.5" (1.715 m)    Weight: 63.5 kg (140 lb)    Length of need (1-99 months): 99    Does patient have medical equipment at home? none    Please check all that apply: Patient is unable to safely ambulate without equipment.      3 IN 1 COMMODE FOR HOME USE     Order Specific Question Answer Comments   Type: Standard    Height: 5' 7.5" (1.715 m)    Weight: 63.5 kg (140 lb)    Does patient have medical equipment at home? none    Length of need (1-99 months): 99      BATH/SHOWER CHAIR FOR HOME USE     Order Specific Question Answer Comments   Height: 5' 7.5" " "(1.715 m)    Weight: 63.5 kg (140 lb)    Does patient have medical equipment at home? none    Length of need (1-99 months): 99    Type: With back      TRANSFER TUB BENCH FOR HOME USE     Order Specific Question Answer Comments   Type of Transfer Tub Bench: Unpadded    Height: 5' 7.5" (1.715 m)    Weight: 63.5 kg (140 lb)    Does patient have medical equipment at home? none    Length of need (1-99 months): 99    Patient notified - Not covered by insurance considered a convenience item Yes    Discussed financial responsibility with responsible party Yes      Diet general     Call MD for:  temperature >100.4     Call MD for:  persistent nausea and vomiting     Call MD for:  severe uncontrolled pain     Call MD for:  difficulty breathing, headache or visual disturbances     Call MD for:  redness, tenderness, or signs of infection (pain, swelling, redness, odor or green/yellow discharge around incision site)     Call MD for:  hives     Call MD for:  persistent dizziness or light-headedness     Call MD for:  extreme fatigue      Follow-up Information       Van Monreal MD Follow up in 4 week(s).    Specialty: Orthopedic Surgery  Contact information:  [address]  [facility]  [facility] [address]  [phone]               [facility] Follow up on 1/29/2025.    Specialties: Home Health Services, Home Therapy Services, Home Living Aide Services  Why: They will contact you for your home healthcare appointments.  Contact information:  [address] [address] [phone]             Dme, [facility] Follow up.    Specialties: DME Provider, Orthotics  Why: Contact if you have questions regarding your medical equipment.  Contact information:  [address]  [facility]  [facility] [address]  [phone]                               "

## (undated) DEVICE — SUT 4.0 ETHILON

## (undated) DEVICE — TOURNIQUET SB QC DP 34X4IN

## (undated) DEVICE — PADDING CAST 6IN DELTA ROLL

## (undated) DEVICE — Device

## (undated) DEVICE — UNDERGLOVE BIOGEL PI SZ 6.5 LF

## (undated) DEVICE — HOOD T7 W/ PEEL AWAY LENS

## (undated) DEVICE — SYR SLIP TIP 1CC

## (undated) DEVICE — SPONGE COTTON TRAY 4X4IN

## (undated) DEVICE — SUT QUILL 2T11 36IN

## (undated) DEVICE — SOL IRR NACL .9% 3000ML

## (undated) DEVICE — SEE MEDLINE ITEM 146231

## (undated) DEVICE — GLOVE BIOGEL SKINSENSE PI 8.5

## (undated) DEVICE — DRAPE EXTREMITY ORTHOMAX

## (undated) DEVICE — SPONGE LAP 18X18 PREWASHED

## (undated) DEVICE — SUT VICRYL PLUS 2-0 CT1 18

## (undated) DEVICE — PAD CAST SPECIALIST STRL 6

## (undated) DEVICE — GLOVE BIOGEL SKINSENSE PI 6.5

## (undated) DEVICE — NDL 21GA X1 1/2 REG BEVEL

## (undated) DEVICE — SEE MEDLINE ITEM 152523

## (undated) DEVICE — DRAPE STERI INSTRUMENT 1018

## (undated) DEVICE — GLOVE BIOGEL PI ORTHO PRO 8.5

## (undated) DEVICE — WRAP COBAN NL STRL 4INX5YD

## (undated) DEVICE — UNDERGLOVES BIOGEL PI SIZE 8.5

## (undated) DEVICE — GAUZE SPONGE 4X4 12PLY

## (undated) DEVICE — SOL NACL IRR 3000ML

## (undated) DEVICE — TOWEL OR DISP STRL BLUE 4/PK

## (undated) DEVICE — ALCOHOL 70% ISOP W/GREEN 16OZ

## (undated) DEVICE — BLADE SAG DUAL 18MMX1.27MMX90M

## (undated) DEVICE — GLOVE BIOGEL SKINSENSE PI 7.5

## (undated) DEVICE — APPLICATOR CHLORAPREP ORN 26ML

## (undated) DEVICE — DRESSING XEROFORM FOIL PK 1X8

## (undated) DEVICE — BLADE GATOR 4.2

## (undated) DEVICE — SEE MEDLINE ITEM 146298

## (undated) DEVICE — SEALER BIPOLAR TISSUE 6.0

## (undated) DEVICE — SOL NACL .9% 250ML INJ

## (undated) DEVICE — SUT VICRYL+ 1 CTX 18IN VIOL

## (undated) DEVICE — COVER HD BACK TABLE 6FT

## (undated) DEVICE — PULSAVAC ZIMMER

## (undated) DEVICE — BLADE RMR PATELLA 38MM W/ PILO

## (undated) DEVICE — PAD COLD THERAPY KNEE WRAP ON

## (undated) DEVICE — SOL IRR SOD CHL .9% POUR

## (undated) DEVICE — CONTAINER SPEC OR STRL 4.5OZ

## (undated) DEVICE — STAPLER SKIN PROXIMATE WIDE

## (undated) DEVICE — BLANKET HYPER ADULT 24X60IN

## (undated) DEVICE — BANDAGE ACE ELASTIC 6"

## (undated) DEVICE — GLOVE BIOGEL SKINSENSE PI 7.0

## (undated) DEVICE — SEE L#120831

## (undated) DEVICE — ELECTRODE REM PLYHSV RETURN 9

## (undated) DEVICE — DRESSING XEROFORM 1X8IN

## (undated) DEVICE — BANDAGE ESMARK ELASTIC ST 6X9

## (undated) DEVICE — CUFF TOURNIQUET DL PRT

## (undated) DEVICE — TRAY CATH 1-LYR URIMTR 16FR

## (undated) DEVICE — DRESSING XEROFORM NONADH 1X8IN

## (undated) DEVICE — UNDERGLOVES BIOGEL PI SZ 7 LF

## (undated) DEVICE — SUT ETHIBOND EXCEL 2 V37 30

## (undated) DEVICE — BANDAGE MATRIX HK LOOP 6IN 5YD

## (undated) DEVICE — KIT TOTAL HIP OPTIVAC

## (undated) DEVICE — PENCIL ELECTROSURG HOLST W/BLD

## (undated) DEVICE — SYR SALINE FLSH PRFL STRL 10ML

## (undated) DEVICE — ALCOHOL ISOPROPYL BLU 70% 16OZ

## (undated) DEVICE — UNDERPAD ULTRASORB 300LB 30X36

## (undated) DEVICE — UNDERGLOVES BIOGEL PI SIZE 8

## (undated) DEVICE — SET IRR URLGY 2LINE UNIV SPIKE

## (undated) DEVICE — UNDERGLOVES BIOGEL PI SIZE 7.5